# Patient Record
Sex: FEMALE | URBAN - METROPOLITAN AREA
[De-identification: names, ages, dates, MRNs, and addresses within clinical notes are randomized per-mention and may not be internally consistent; named-entity substitution may affect disease eponyms.]

---

## 2019-03-07 ENCOUNTER — HOSPITAL ENCOUNTER (INPATIENT)
Facility: HOSPITAL | Age: 29
LOS: 6 days | Discharge: HOME | DRG: 546 | End: 2019-03-13
Attending: INTERNAL MEDICINE | Admitting: INTERNAL MEDICINE
Payer: MEDICARE

## 2019-03-07 DIAGNOSIS — R80.9 PROTEINURIA, UNSPECIFIED TYPE: ICD-10-CM

## 2019-03-07 DIAGNOSIS — D64.9 ANEMIA, UNSPECIFIED TYPE: ICD-10-CM

## 2019-03-07 DIAGNOSIS — I31.39 PERICARDIAL EFFUSION: ICD-10-CM

## 2019-03-07 DIAGNOSIS — R76.8 ANA POSITIVE: ICD-10-CM

## 2019-03-07 DIAGNOSIS — G40.909 SEIZURE DISORDER: ICD-10-CM

## 2019-03-07 DIAGNOSIS — I31.4 PERICARDIAL TAMPONADE: Primary | ICD-10-CM

## 2019-03-07 LAB
ALBUMIN SERPL BCP-MCNC: 1.8 G/DL
ALP SERPL-CCNC: 94 U/L
ALT SERPL W/O P-5'-P-CCNC: 60 U/L
ANA SER QL IF: POSITIVE
ANA TITR SER IF: NORMAL {TITER}
ANION GAP SERPL CALC-SCNC: 4 MMOL/L
AST SERPL-CCNC: 127 U/L
AV INDEX (PROSTH): 0.81
AV MEAN GRADIENT: 3.39 MMHG
AV PEAK GRADIENT: 6.15 MMHG
AV VALVE AREA: 2.56 CM2
AV VELOCITY RATIO: 0.88
BASOPHILS # BLD AUTO: 0.01 K/UL
BASOPHILS NFR BLD: 0.1 %
BILIRUB SERPL-MCNC: 0.2 MG/DL
BSA FOR ECHO PROCEDURE: 1.6 M2
BUN SERPL-MCNC: 7 MG/DL
C3 SERPL-MCNC: 70 MG/DL
C4 SERPL-MCNC: 15 MG/DL
CALCIUM SERPL-MCNC: 7.7 MG/DL
CHLORIDE SERPL-SCNC: 103 MMOL/L
CK SERPL-CCNC: 909 U/L
CO2 SERPL-SCNC: 29 MMOL/L
CREAT SERPL-MCNC: 0.5 MG/DL
CRP SERPL-MCNC: 43.5 MG/L
CV ECHO LV RWT: 0.36 CM
DAT IGG-SP REAG RBC-IMP: NORMAL
DIFFERENTIAL METHOD: ABNORMAL
DOP CALC AO PEAK VEL: 1.24 M/S
DOP CALC AO VTI: 23 CM
DOP CALC LVOT AREA: 3.17 CM2
DOP CALC LVOT DIAMETER: 2.01 CM
DOP CALC LVOT PEAK VEL: 1.09 M/S
DOP CALC LVOT STROKE VOLUME: 58.89 CM3
DOP CALCLVOT PEAK VEL VTI: 18.57 CM
E/E' RATIO: 13.63
ECHO LV POSTERIOR WALL: 0.75 CM (ref 0.6–1.1)
EOSINOPHIL # BLD AUTO: 0.1 K/UL
EOSINOPHIL NFR BLD: 1.8 %
ERYTHROCYTE [DISTWIDTH] IN BLOOD BY AUTOMATED COUNT: 14.5 %
ERYTHROCYTE [SEDIMENTATION RATE] IN BLOOD BY WESTERGREN METHOD: 56 MM/HR
EST. GFR  (AFRICAN AMERICAN): >60 ML/MIN/1.73 M^2
EST. GFR  (NON AFRICAN AMERICAN): >60 ML/MIN/1.73 M^2
FERRITIN SERPL-MCNC: 201 NG/ML
FRACTIONAL SHORTENING: 39 % (ref 28–44)
GLUCOSE SERPL-MCNC: 89 MG/DL
HCT VFR BLD AUTO: 30.8 %
HGB BLD-MCNC: 9.6 G/DL
IMM GRANULOCYTES # BLD AUTO: 0.07 K/UL
IMM GRANULOCYTES NFR BLD AUTO: 1 %
INR PPP: 1.1
INTERVENTRICULAR SEPTUM: 0.7 CM (ref 0.6–1.1)
IRON SERPL-MCNC: 59 UG/DL
LA MAJOR: 4.74 CM
LA MINOR: 4.18 CM
LA PPP-IMP: NEGATIVE
LA WIDTH: 3.18 CM
LEFT ATRIUM SIZE: 2 CM
LEFT ATRIUM VOLUME INDEX: 14.9 ML/M2
LEFT ATRIUM VOLUME: 24.02 CM3
LEFT INTERNAL DIMENSION IN SYSTOLE: 2.5 CM (ref 2.1–4)
LEFT VENTRICLE DIASTOLIC VOLUME INDEX: 46.6 ML/M2
LEFT VENTRICLE DIASTOLIC VOLUME: 74.98 ML
LEFT VENTRICLE MASS INDEX: 53.6 G/M2
LEFT VENTRICLE SYSTOLIC VOLUME INDEX: 13.8 ML/M2
LEFT VENTRICLE SYSTOLIC VOLUME: 22.25 ML
LEFT VENTRICULAR INTERNAL DIMENSION IN DIASTOLE: 4.12 CM (ref 3.5–6)
LEFT VENTRICULAR MASS: 86.19 G
LV LATERAL E/E' RATIO: 15.57
LV SEPTAL E/E' RATIO: 12.11
LYMPHOCYTES # BLD AUTO: 2.1 K/UL
LYMPHOCYTES NFR BLD: 28.9 %
MAGNESIUM SERPL-MCNC: 1.1 MG/DL
MCH RBC QN AUTO: 30.3 PG
MCHC RBC AUTO-ENTMCNC: 31.2 G/DL
MCV RBC AUTO: 97 FL
MONOCYTES # BLD AUTO: 0.7 K/UL
MONOCYTES NFR BLD: 9.6 %
MV PEAK E VEL: 1.09 M/S
NEUTROPHILS # BLD AUTO: 4.3 K/UL
NEUTROPHILS NFR BLD: 58.6 %
NRBC BLD-RTO: 0 /100 WBC
PHOSPHATE SERPL-MCNC: 2.6 MG/DL
PISA TR MAX VEL: 2.64 M/S
PLATELET # BLD AUTO: 347 K/UL
PMV BLD AUTO: 10.1 FL
POTASSIUM SERPL-SCNC: 3.4 MMOL/L
PROT SERPL-MCNC: 6.7 G/DL
PROTHROMBIN TIME: 11.2 SEC
RA MAJOR: 4.42 CM
RA PRESSURE: 3 MMHG
RA WIDTH: 3.34 CM
RBC # BLD AUTO: 3.17 M/UL
RIGHT VENTRICULAR END-DIASTOLIC DIMENSION: 3.55 CM
SATURATED IRON: 51 %
SINUS: 2.88 CM
SODIUM SERPL-SCNC: 136 MMOL/L
TDI LATERAL: 0.07
TDI SEPTAL: 0.09
TDI: 0.08
TOTAL IRON BINDING CAPACITY: 115 UG/DL
TR MAX PG: 27.88 MMHG
TRANSFERRIN SERPL-MCNC: 78 MG/DL
TV REST PULMONARY ARTERY PRESSURE: 31 MMHG
WBC # BLD AUTO: 7.29 K/UL

## 2019-03-07 PROCEDURE — 87040 BLOOD CULTURE FOR BACTERIA: CPT | Mod: 59

## 2019-03-07 PROCEDURE — 83735 ASSAY OF MAGNESIUM: CPT

## 2019-03-07 PROCEDURE — 80053 COMPREHEN METABOLIC PANEL: CPT

## 2019-03-07 PROCEDURE — 99291 PR CRITICAL CARE, E/M 30-74 MINUTES: ICD-10-PCS | Mod: GC,,, | Performed by: INTERNAL MEDICINE

## 2019-03-07 PROCEDURE — 99223 1ST HOSP IP/OBS HIGH 75: CPT | Mod: ,,, | Performed by: INTERNAL MEDICINE

## 2019-03-07 PROCEDURE — 25000003 PHARM REV CODE 250: Performed by: PHYSICIAN ASSISTANT

## 2019-03-07 PROCEDURE — 82728 ASSAY OF FERRITIN: CPT

## 2019-03-07 PROCEDURE — 82550 ASSAY OF CK (CPK): CPT

## 2019-03-07 PROCEDURE — 95816 PR EEG,W/AWAKE & DROWSY RECORD: ICD-10-PCS | Mod: 26,,, | Performed by: PSYCHIATRY & NEUROLOGY

## 2019-03-07 PROCEDURE — 95816 EEG AWAKE AND DROWSY: CPT | Mod: 26,,, | Performed by: PSYCHIATRY & NEUROLOGY

## 2019-03-07 PROCEDURE — 86235 NUCLEAR ANTIGEN ANTIBODY: CPT | Mod: 59

## 2019-03-07 PROCEDURE — 85025 COMPLETE CBC W/AUTO DIFF WBC: CPT

## 2019-03-07 PROCEDURE — 86146 BETA-2 GLYCOPROTEIN ANTIBODY: CPT | Mod: 59

## 2019-03-07 PROCEDURE — 36415 COLL VENOUS BLD VENIPUNCTURE: CPT

## 2019-03-07 PROCEDURE — 86147 CARDIOLIPIN ANTIBODY EA IG: CPT

## 2019-03-07 PROCEDURE — 85613 RUSSELL VIPER VENOM DILUTED: CPT

## 2019-03-07 PROCEDURE — 99291 CRITICAL CARE FIRST HOUR: CPT | Mod: GC,,, | Performed by: INTERNAL MEDICINE

## 2019-03-07 PROCEDURE — 85610 PROTHROMBIN TIME: CPT

## 2019-03-07 PROCEDURE — 86160 COMPLEMENT ANTIGEN: CPT

## 2019-03-07 PROCEDURE — 83540 ASSAY OF IRON: CPT

## 2019-03-07 PROCEDURE — 63600175 PHARM REV CODE 636 W HCPCS: Performed by: PHYSICIAN ASSISTANT

## 2019-03-07 PROCEDURE — 86140 C-REACTIVE PROTEIN: CPT

## 2019-03-07 PROCEDURE — 86038 ANTINUCLEAR ANTIBODIES: CPT

## 2019-03-07 PROCEDURE — 99223 PR INITIAL HOSPITAL CARE,LEVL III: ICD-10-PCS | Mod: ,,, | Performed by: INTERNAL MEDICINE

## 2019-03-07 PROCEDURE — 95816 EEG AWAKE AND DROWSY: CPT

## 2019-03-07 PROCEDURE — 86039 ANTINUCLEAR ANTIBODIES (ANA): CPT

## 2019-03-07 PROCEDURE — 83516 IMMUNOASSAY NONANTIBODY: CPT

## 2019-03-07 PROCEDURE — 25000003 PHARM REV CODE 250

## 2019-03-07 PROCEDURE — 20000000 HC ICU ROOM

## 2019-03-07 PROCEDURE — 82085 ASSAY OF ALDOLASE: CPT

## 2019-03-07 PROCEDURE — 84100 ASSAY OF PHOSPHORUS: CPT

## 2019-03-07 PROCEDURE — 85652 RBC SED RATE AUTOMATED: CPT

## 2019-03-07 PROCEDURE — 86160 COMPLEMENT ANTIGEN: CPT | Mod: 59

## 2019-03-07 PROCEDURE — 80074 ACUTE HEPATITIS PANEL: CPT

## 2019-03-07 PROCEDURE — 86880 COOMBS TEST DIRECT: CPT

## 2019-03-07 PROCEDURE — 63600175 PHARM REV CODE 636 W HCPCS

## 2019-03-07 RX ORDER — ACETAMINOPHEN 325 MG/1
650 TABLET ORAL EVERY 4 HOURS PRN
Status: DISCONTINUED | OUTPATIENT
Start: 2019-03-07 | End: 2019-03-13 | Stop reason: HOSPADM

## 2019-03-07 RX ORDER — ONDANSETRON 8 MG/1
8 TABLET, ORALLY DISINTEGRATING ORAL EVERY 8 HOURS PRN
Status: DISCONTINUED | OUTPATIENT
Start: 2019-03-07 | End: 2019-03-13 | Stop reason: HOSPADM

## 2019-03-07 RX ORDER — SODIUM CHLORIDE 0.9 % (FLUSH) 0.9 %
3 SYRINGE (ML) INJECTION
Status: DISCONTINUED | OUTPATIENT
Start: 2019-03-07 | End: 2019-03-13 | Stop reason: HOSPADM

## 2019-03-07 RX ORDER — ENOXAPARIN SODIUM 100 MG/ML
40 INJECTION SUBCUTANEOUS EVERY 24 HOURS
Status: DISCONTINUED | OUTPATIENT
Start: 2019-03-07 | End: 2019-03-13 | Stop reason: HOSPADM

## 2019-03-07 RX ORDER — MAGNESIUM SULFATE HEPTAHYDRATE 40 MG/ML
2 INJECTION, SOLUTION INTRAVENOUS ONCE
Status: COMPLETED | OUTPATIENT
Start: 2019-03-07 | End: 2019-03-07

## 2019-03-07 RX ORDER — METHYLPREDNISOLONE SOD SUCC 125 MG
125 VIAL (EA) INJECTION DAILY
Status: DISCONTINUED | OUTPATIENT
Start: 2019-03-07 | End: 2019-03-08

## 2019-03-07 RX ORDER — SODIUM,POTASSIUM PHOSPHATES 280-250MG
2 POWDER IN PACKET (EA) ORAL EVERY 4 HOURS
Status: COMPLETED | OUTPATIENT
Start: 2019-03-07 | End: 2019-03-07

## 2019-03-07 RX ORDER — SODIUM,POTASSIUM PHOSPHATES 280-250MG
2 POWDER IN PACKET (EA) ORAL EVERY 4 HOURS
Status: DISPENSED | OUTPATIENT
Start: 2019-03-07 | End: 2019-03-07

## 2019-03-07 RX ORDER — LEVETIRACETAM 500 MG/1
500 TABLET ORAL 2 TIMES DAILY
Status: DISCONTINUED | OUTPATIENT
Start: 2019-03-07 | End: 2019-03-08

## 2019-03-07 RX ORDER — ONDANSETRON 2 MG/ML
4 INJECTION INTRAMUSCULAR; INTRAVENOUS EVERY 8 HOURS PRN
Status: DISCONTINUED | OUTPATIENT
Start: 2019-03-07 | End: 2019-03-13 | Stop reason: HOSPADM

## 2019-03-07 RX ORDER — MAGNESIUM SULFATE HEPTAHYDRATE 40 MG/ML
2 INJECTION, SOLUTION INTRAVENOUS
Status: DISPENSED | OUTPATIENT
Start: 2019-03-07 | End: 2019-03-07

## 2019-03-07 RX ADMIN — POTASSIUM & SODIUM PHOSPHATES POWDER PACK 280-160-250 MG 2 PACKET: 280-160-250 PACK at 05:03

## 2019-03-07 RX ADMIN — ENOXAPARIN SODIUM 40 MG: 100 INJECTION SUBCUTANEOUS at 05:03

## 2019-03-07 RX ADMIN — LEVETIRACETAM 500 MG: 500 TABLET ORAL at 08:03

## 2019-03-07 RX ADMIN — ACETAMINOPHEN 650 MG: 325 TABLET ORAL at 08:03

## 2019-03-07 RX ADMIN — POTASSIUM & SODIUM PHOSPHATES POWDER PACK 280-160-250 MG 2 PACKET: 280-160-250 PACK at 09:03

## 2019-03-07 RX ADMIN — ESLICARBAZEPINE ACETATE 1200 MG: 400 TABLET ORAL at 09:03

## 2019-03-07 RX ADMIN — MAGNESIUM SULFATE IN WATER 2 G: 40 INJECTION, SOLUTION INTRAVENOUS at 05:03

## 2019-03-07 RX ADMIN — LEVETIRACETAM 500 MG: 500 TABLET ORAL at 09:03

## 2019-03-07 RX ADMIN — ONDANSETRON 4 MG: 2 INJECTION INTRAMUSCULAR; INTRAVENOUS at 03:03

## 2019-03-07 RX ADMIN — METHYLPREDNISOLONE SODIUM SUCCINATE 125 MG: 125 INJECTION, POWDER, FOR SOLUTION INTRAMUSCULAR; INTRAVENOUS at 09:03

## 2019-03-07 RX ADMIN — POTASSIUM & SODIUM PHOSPHATES POWDER PACK 280-160-250 MG 2 PACKET: 280-160-250 PACK at 11:03

## 2019-03-07 RX ADMIN — MAGNESIUM SULFATE IN WATER 2 G: 40 INJECTION, SOLUTION INTRAVENOUS at 11:03

## 2019-03-07 NOTE — SUBJECTIVE & OBJECTIVE
No past medical history on file.    No past surgical history on file.    Review of patient's allergies indicates:  No Known Allergies    Family History     None        Tobacco Use    Smoking status: Not on file   Substance and Sexual Activity    Alcohol use: Not on file    Drug use: Not on file    Sexual activity: Not on file      Review of Systems   Constitutional: Positive for fatigue. Negative for fever.   Respiratory: Negative for cough, shortness of breath and wheezing.    Cardiovascular: Positive for chest pain. Negative for palpitations and leg swelling.   Gastrointestinal: Negative for abdominal distention, abdominal pain, constipation, diarrhea, nausea and vomiting.   Endocrine: Negative for polydipsia and polyphagia.   Genitourinary: Negative for dysuria and frequency.   Musculoskeletal: Negative for arthralgias and myalgias.   Neurological: Negative for weakness, light-headedness and headaches.   Hematological: Negative for adenopathy.   Psychiatric/Behavioral: Negative for behavioral problems and confusion.     Objective:     Vital Signs (Most Recent):  Temp: 97.7 °F (36.5 °C) (03/07/19 0545)  Pulse: 88 (03/07/19 0545)  Resp: (!) 26 (03/07/19 0545)  BP: 102/71 (03/07/19 0545)  SpO2: (!) 93 % (03/07/19 0545) Vital Signs (24h Range):  Temp:  [97.7 °F (36.5 °C)-99 °F (37.2 °C)] 97.7 °F (36.5 °C)  Pulse:  [] 88  Resp:  [18-26] 26  SpO2:  [93 %-100 %] 93 %  BP: (102-107)/(61-71) 102/71      There is no height or weight on file to calculate BMI.    No intake or output data in the 24 hours ending 03/07/19 0733    Physical Exam   Constitutional: She is oriented to person, place, and time. She appears well-developed and well-nourished. No distress.   HENT:   Head: Normocephalic and atraumatic.   Mouth/Throat: No oropharyngeal exudate.   Eyes: Pupils are equal, round, and reactive to light.   Neck: Normal range of motion.   R IJ in place and dressed   Cardiovascular: Normal rate, regular rhythm, normal  heart sounds and intact distal pulses.   No murmur heard.  percardial drain in place with serosanguinous drainage    Pulmonary/Chest: Effort normal and breath sounds normal. No stridor. No respiratory distress.   Abdominal: Soft. Bowel sounds are normal. She exhibits no distension. There is no tenderness.   Musculoskeletal: Normal range of motion. She exhibits no edema.   Noted bilateral rash on lower extremities   Neurological: She is alert and oriented to person, place, and time. No cranial nerve deficit.   Skin: Skin is warm and dry. She is not diaphoretic.   Psychiatric: She has a normal mood and affect. Her behavior is normal.   Vitals reviewed.      Vents:     Lines/Drains/Airways          None        Significant Labs:    Recent Results (from the past 24 hour(s))   CBC auto differential    Collection Time: 03/07/19  6:07 AM   Result Value Ref Range    WBC 7.29 3.90 - 12.70 K/uL    RBC 3.17 (L) 4.00 - 5.40 M/uL    Hemoglobin 9.6 (L) 12.0 - 16.0 g/dL    Hematocrit 30.8 (L) 37.0 - 48.5 %    MCV 97 82 - 98 fL    MCH 30.3 27.0 - 31.0 pg    MCHC 31.2 (L) 32.0 - 36.0 g/dL    RDW 14.5 11.5 - 14.5 %    Platelets 347 150 - 350 K/uL    MPV 10.1 9.2 - 12.9 fL    Immature Granulocytes 1.0 (H) 0.0 - 0.5 %    Gran # (ANC) 4.3 1.8 - 7.7 K/uL    Immature Grans (Abs) 0.07 (H) 0.00 - 0.04 K/uL    Lymph # 2.1 1.0 - 4.8 K/uL    Mono # 0.7 0.3 - 1.0 K/uL    Eos # 0.1 0.0 - 0.5 K/uL    Baso # 0.01 0.00 - 0.20 K/uL    nRBC 0 0 /100 WBC    Gran% 58.6 38.0 - 73.0 %    Lymph% 28.9 18.0 - 48.0 %    Mono% 9.6 4.0 - 15.0 %    Eosinophil% 1.8 0.0 - 8.0 %    Basophil% 0.1 0.0 - 1.9 %    Differential Method Automated    Comprehensive metabolic panel    Collection Time: 03/07/19  6:07 AM   Result Value Ref Range    Sodium 136 136 - 145 mmol/L    Potassium 3.4 (L) 3.5 - 5.1 mmol/L    Chloride 103 95 - 110 mmol/L    CO2 29 23 - 29 mmol/L    Glucose 89 70 - 110 mg/dL    BUN, Bld 7 6 - 20 mg/dL    Creatinine 0.5 0.5 - 1.4 mg/dL    Calcium 7.7 (L)  8.7 - 10.5 mg/dL    Total Protein 6.7 6.0 - 8.4 g/dL    Albumin 1.8 (L) 3.5 - 5.2 g/dL    Total Bilirubin 0.2 0.1 - 1.0 mg/dL    Alkaline Phosphatase 94 55 - 135 U/L     (H) 10 - 40 U/L    ALT 60 (H) 10 - 44 U/L    Anion Gap 4 (L) 8 - 16 mmol/L    eGFR if African American >60.0 >60 mL/min/1.73 m^2    eGFR if non African American >60.0 >60 mL/min/1.73 m^2   Magnesium    Collection Time: 03/07/19  6:07 AM   Result Value Ref Range    Magnesium 1.1 (L) 1.6 - 2.6 mg/dL   Phosphorus    Collection Time: 03/07/19  6:07 AM   Result Value Ref Range    Phosphorus 2.6 (L) 2.7 - 4.5 mg/dL   C4 complement    Collection Time: 03/07/19  6:07 AM   Result Value Ref Range    Complement (C-4) 15 11 - 44 mg/dL   C3 complement    Collection Time: 03/07/19  6:07 AM   Result Value Ref Range    Complement (C-3) 70 50 - 180 mg/dL   Sedimentation rate    Collection Time: 03/07/19  6:07 AM   Result Value Ref Range    Sed Rate 56 (H) 0 - 36 mm/Hr   C-reactive protein    Collection Time: 03/07/19  6:07 AM   Result Value Ref Range    CRP 43.5 (H) 0.0 - 8.2 mg/L   Protime-INR    Collection Time: 03/07/19  6:07 AM   Result Value Ref Range    Prothrombin Time 11.2 9.0 - 12.5 sec    INR 1.1 0.8 - 1.2       Significant Imaging: I have reviewed all pertinent imaging results/findings within the past 24 hours.

## 2019-03-07 NOTE — ASSESSMENT & PLAN NOTE
- noted tamponade s/p pericardial drain  - high consideration for connective tissue disease/autoimmune disease as cause   - overall, will repeat SLE labs for now  - hold off antibiotics given no growth per OSH  - drain placed, will need definitive drain management   - rheumatology consulted, appreciate recommendations and assistance

## 2019-03-07 NOTE — HPI
Pinky Lanier is a 29 y.o. lady with a previous seizure disorder who presents as a transfer from [facility] in [place] for further evaluation for pericardial effusion.  Per chart review, patient was recently admitted to on 2/25/2019 for acute chest pain.  SHe was diagnosed with acute, likely viral pericarditis and was noted to have moderate pericardial effusion without tamponade physiology.  She was started on colchicine 0.6 mg PO daily and ibuprofen.  She was subsequently discharged on 2/27 in good condition, however she had noticed worsening severe chest pain that was associated with light headedness, dizziness, and dyspnea.  She then presented to [facility] where she was noted to have hypotension with a BP of 90/60.  There, a CT chest revealed a large pericardial effusion as well as bilateral pleural effusions, R > L.  She was started on a levophed gtt for BP management.  Subsequent 2D echo revealed a large effusion with a maximum thickness of 3.2 cm without evidence of collapse of the R ventricle.  She underwent subsequent pericardial drain placement with initial drain output of 500 ccs per shift.  This eventually improved to approximately 50 ccs within the shift prior to transfer.  Fluid analysis noted 13k WBCs, low pH, and glucose in the 60s; gram stain was negative.  Other labs at the time revealed hypoalbuminemia and hypocomplementemia, where there was concern that the underlying effusions were secondary to lupus.  She was started on methylpred 125 mg IV daily.  CBCs at the time mostly revealed an anemia, but otherwise no leukopenia/leukocytosis or thrombocytopenia.  CMP was also unremarkable with normal kidney function.  She was noted there to have an elevated urine protein.  Additionally, neurology was consulted, who performed an EEG without any epileptiform activity noted.  She was started on keppra, continued on eslicarbazepine, and discontinued phenytoin.  ELVER and anti-histone reported  returned with elevated ELVER, but none were reported.     She was transferred to [facility] on 3/7/2019.  She notes that she has never had chest pain like this previously.  Denies any personal history of skin rashes, light sensitivity rashes, malar rashes, oral ulcers, raynaud's phenomenon, or arthritis.  Denies any family history of lupus or any other rheumatologic disease.  Denies any history of miscarriages or DVTs.  Currently living with her boyfriend.

## 2019-03-07 NOTE — SUBJECTIVE & OBJECTIVE
No past medical history on file.    No past surgical history on file.      There is no immunization history on file for this patient.    Review of patient's allergies indicates:  No Known Allergies  Current Facility-Administered Medications   Medication Frequency    acetaminophen tablet 650 mg Q4H PRN    enoxaparin injection 40 mg Daily    eslicarbazepine Tab 1,200 mg QHS    levETIRAcetam tablet 500 mg BID    magnesium sulfate 2g in water 50mL IVPB (premix) Once    methylPREDNISolone sodium succinate injection 125 mg Daily    ondansetron disintegrating tablet 8 mg Q8H PRN    ondansetron injection 4 mg Q8H PRN    potassium, sodium phosphates 280-160-250 mg packet 2 packet Q4H    potassium, sodium phosphates 280-160-250 mg packet 2 packet Q4H    sodium chloride 0.9% flush 3 mL PRN     Family History     None        Tobacco Use    Smoking status: Not on file   Substance and Sexual Activity    Alcohol use: Not on file    Drug use: Not on file    Sexual activity: Not on file     Review of Systems   Constitutional: Negative for fever.   HENT: Negative for mouth sores.         Negative hair loss    Eyes: Negative for pain and redness.   Respiratory: Negative for chest tightness and shortness of breath.    Cardiovascular: Negative for chest pain.   Gastrointestinal: Negative for abdominal pain.   Genitourinary: Negative for dysuria.   Musculoskeletal: Negative for arthralgias and joint swelling.   Skin: Negative for rash.   Neurological: Positive for seizures.     Objective:     Vital Signs (Most Recent):  Temp: 97.8 °F (36.6 °C) (03/07/19 0700)  Pulse: 87 (03/07/19 1200)  Resp: 19 (03/07/19 1200)  BP: 103/64 (03/07/19 1200)  SpO2: 99 % (03/07/19 1200)  O2 Device (Oxygen Therapy): nasal cannula (03/07/19 1300) Vital Signs (24h Range):  Temp:  [97.7 °F (36.5 °C)-99 °F (37.2 °C)] 97.8 °F (36.6 °C)  Pulse:  [] 87  Resp:  [15-26] 19  SpO2:  [93 %-100 %] 99 %  BP: (100-107)/(58-75) 103/64     Weight: 55.8 kg  (123 lb) (03/07/19 1200)  Body mass index is 20.47 kg/m².  Body surface area is 1.6 meters squared.      Intake/Output Summary (Last 24 hours) at 3/7/2019 1742  Last data filed at 3/7/2019 1400  Gross per 24 hour   Intake --   Output 401 ml   Net -401 ml       Physical Exam   Constitutional: She is well-developed, well-nourished, and in no distress. No distress.   HENT:   Head: Normocephalic and atraumatic.   Mouth/Throat: Oropharynx is clear and moist. No oropharyngeal exudate.   Eyes: Conjunctivae and EOM are normal. Pupils are equal, round, and reactive to light. No scleral icterus.   Neck: Normal range of motion. Neck supple.   Cardiovascular: Normal rate and regular rhythm.    Mild friction rub    Pulmonary/Chest: She has no wheezes.   Decreased breath sounds in bases   On NC  Pericardial drain in place    Abdominal: Soft. Bowel sounds are normal. She exhibits no distension.   Lymphadenopathy:     She has no cervical adenopathy.   Neurological: She is alert.   Focused on pulse ox on finger, answers yes or no to most questions    Skin: Skin is warm and dry. She is not diaphoretic.     Hyperpigmented rash on LE b/l    Musculoskeletal: Normal range of motion. She exhibits no edema.   No synovitis in any joint   Mild left knee effusion, knee is not warm or erythematous          Significant Labs:  BMP:   Recent Labs   Lab 03/07/19  0607   GLU 89      K 3.4*      CO2 29   BUN 7   CREATININE 0.5   CALCIUM 7.7*   MG 1.1*     CBC:   Recent Labs   Lab 03/07/19  0607   WBC 7.29   HGB 9.6*   HCT 30.8*        CMP:   Recent Labs   Lab 03/07/19  0607   GLU 89   CALCIUM 7.7*   ALBUMIN 1.8*   PROT 6.7      K 3.4*   CO2 29      BUN 7   CREATININE 0.5   ALKPHOS 94   ALT 60*   *   BILITOT 0.2     Urinalysis: No results for input(s): COLORU, CLARITYU, SPECGRAV, PHUR, PROTEINUA, GLUCOSEU, BILIRUBINCON, BLOODU, WBCU, RBCU, BACTERIA, MUCUS, NITRITE, LEUKOCYTESUR, UROBILINOGEN, HYALINECASTS in the  last 24 hours.  Urine protein creatinine: No results for input(s): UTPCR in the last 24 hours.    Significant Imaging:  Imaging results within the past 24 hours have been reviewed.

## 2019-03-07 NOTE — CONSULTS
[facility]  Rheumatology  Consult Note    Patient Name: Pinky Lanier  MRN: [id number]  Admission Date: 3/7/2019  Hospital Length of Stay: 0 days  Code Status: Full Code   Attending Provider: Desiree Henson MD  Primary Care Physician: Primary Doctor No  Principal Problem:Pericardial tamponade    Consults  Subjective:     HPI: Pinky Lanier is a 29 year old female with past medical history of seizure disorder (since the age of 1 mos) who presents as a transfer from [facility] in [place] for further evaluation for pericardial effusion and pleural effusions.     Patient was recently admitted to [facility] on 2/25/2019 for acute chest pain.She was diagnosed with acute, likely viral pericarditis and was noted to have moderate pericardial effusion without tamponade physiology.  She was started on colchicine 0.6 mg PO BID and ibuprofen 600mg tid .  She was subsequently discharged on 2/27 in good condition, however she had noticed worsening severe chest pain that was associated with light headedness, dizziness, and dyspnea.    She then presented to [facility] on 3/3 where she was noted to have hypotension with a BP of 90/60.  There, a CT chest revealed a large pericardial effusion as well as bilateral pleural effusions, R > L.  She was started on a levophed gtt for BP management.  Subsequent 2D echo revealed a large effusion with a maximum thickness of 3.2 cm without evidence of collapse of the R ventricle.  She underwent subsequent pericardial drain placement 3/4/19 with initial drain output of 500 ccs per shift.  This eventually improved to approximately 50 ccs within the shift prior to transfer.  Fluid analysis noted 13k WBCs, low pH, and glucose in the 60s; gram stain was negative.  Other labs at the time revealed hypoalbuminemia and hypocomplementemia, and positve ELVER. where there was concern that the underlying effusions were secondary to lupus.  She was initially started on  prednisone 30mg and then increased to methylpred 125 mg IV daily.   Neurology was also who performed an EEG which showed cortical irratlibility from the left frontal central parietal region as well as mild encephalopathy, no seizure activity.  She was started on keppra, continued on eslicarbazepine, and discontinued on phenytoin.      She was transferred to [facility] on 3/7/2019.  Here she was intimally somnolent but then later awake and alert. She denies any chest pain, shortness of breath, rashes, joint pain or swelling, no raynads, oral or nasal ulcers.   No family history of SLE other autoimmune conditions           No past medical history on file.    No past surgical history on file.      There is no immunization history on file for this patient.    Review of patient's allergies indicates:  No Known Allergies  Current Facility-Administered Medications   Medication Frequency    acetaminophen tablet 650 mg Q4H PRN    enoxaparin injection 40 mg Daily    eslicarbazepine Tab 1,200 mg QHS    levETIRAcetam tablet 500 mg BID    magnesium sulfate 2g in water 50mL IVPB (premix) Once    methylPREDNISolone sodium succinate injection 125 mg Daily    ondansetron disintegrating tablet 8 mg Q8H PRN    ondansetron injection 4 mg Q8H PRN    potassium, sodium phosphates 280-160-250 mg packet 2 packet Q4H    potassium, sodium phosphates 280-160-250 mg packet 2 packet Q4H    sodium chloride 0.9% flush 3 mL PRN     Family History     None        Tobacco Use    Smoking status: Not on file   Substance and Sexual Activity    Alcohol use: Not on file    Drug use: Not on file    Sexual activity: Not on file     Review of Systems   Constitutional: Negative for fever.   HENT: Negative for mouth sores.         Negative hair loss    Eyes: Negative for pain and redness.   Respiratory: Negative for chest tightness and shortness of breath.    Cardiovascular: Negative for chest pain.   Gastrointestinal: Negative for abdominal pain.    Genitourinary: Negative for dysuria.   Musculoskeletal: Negative for arthralgias and joint swelling.   Skin: Negative for rash.   Neurological: Positive for seizures.     Objective:     Vital Signs (Most Recent):  Temp: 97.8 °F (36.6 °C) (03/07/19 0700)  Pulse: 87 (03/07/19 1200)  Resp: 19 (03/07/19 1200)  BP: 103/64 (03/07/19 1200)  SpO2: 99 % (03/07/19 1200)  O2 Device (Oxygen Therapy): nasal cannula (03/07/19 1300) Vital Signs (24h Range):  Temp:  [97.7 °F (36.5 °C)-99 °F (37.2 °C)] 97.8 °F (36.6 °C)  Pulse:  [] 87  Resp:  [15-26] 19  SpO2:  [93 %-100 %] 99 %  BP: (100-107)/(58-75) 103/64     Weight: 55.8 kg (123 lb) (03/07/19 1200)  Body mass index is 20.47 kg/m².  Body surface area is 1.6 meters squared.      Intake/Output Summary (Last 24 hours) at 3/7/2019 1742  Last data filed at 3/7/2019 1400  Gross per 24 hour   Intake --   Output 401 ml   Net -401 ml       Physical Exam   Constitutional: She is well-developed, well-nourished, and in no distress. No distress.   HENT:   Head: Normocephalic and atraumatic.   Mouth/Throat: Oropharynx is clear and moist. No oropharyngeal exudate.   Eyes: Conjunctivae and EOM are normal. Pupils are equal, round, and reactive to light. No scleral icterus.   Neck: Normal range of motion. Neck supple.   Cardiovascular: Normal rate and regular rhythm.    Mild friction rub    Pulmonary/Chest: She has no wheezes.   Decreased breath sounds in bases   On NC  Pericardial drain in place    Abdominal: Soft. Bowel sounds are normal. She exhibits no distension.   Lymphadenopathy:     She has no cervical adenopathy.   Neurological: She is alert.   Focused on pulse ox on finger, answers yes or no to most questions    Skin: Skin is warm and dry. She is not diaphoretic.     Hyperpigmented rash on LE b/l    Musculoskeletal: Normal range of motion. She exhibits no edema.   No synovitis in any joint   Mild left knee effusion, knee is not warm or erythematous          Significant  Labs:  BMP:   Recent Labs   Lab 03/07/19  0607   GLU 89      K 3.4*      CO2 29   BUN 7   CREATININE 0.5   CALCIUM 7.7*   MG 1.1*     CBC:   Recent Labs   Lab 03/07/19  0607   WBC 7.29   HGB 9.6*   HCT 30.8*        CMP:   Recent Labs   Lab 03/07/19  0607   GLU 89   CALCIUM 7.7*   ALBUMIN 1.8*   PROT 6.7      K 3.4*   CO2 29      BUN 7   CREATININE 0.5   ALKPHOS 94   ALT 60*   *   BILITOT 0.2     Urinalysis: No results for input(s): COLORU, CLARITYU, SPECGRAV, PHUR, PROTEINUA, GLUCOSEU, BILIRUBINCON, BLOODU, WBCU, RBCU, BACTERIA, MUCUS, NITRITE, LEUKOCYTESUR, UROBILINOGEN, HYALINECASTS in the last 24 hours.  Urine protein creatinine: No results for input(s): UTPCR in the last 24 hours.    Significant Imaging:  Imaging results within the past 24 hours have been reviewed.    Assessment/Plan:     ELVER positive    Pinky Lanier is a 29 year old female with past medical history of seizure disorder (since the age of 1 mos) who presents as a transfer from [facility] in [place] for further evaluation for pericardial effusion and pleural effusions.     Patient was recently admitted to [facility] on 2/25/2019 for acute chest pain.She was diagnosed with acute, likely viral pericarditis and was noted to have moderate pericardial effusion without tamponade physiology.  She was started on colchicine 0.6 mg PO BID and ibuprofen 600mg tid .  She was subsequently discharged on 2/27 in good condition, however she had noticed worsening severe chest pain that was associated with light headedness, dizziness, and dyspnea.    She then presented to [facility] on 3/3 where she was noted to have hypotension with a BP of 90/60.  There, a CT chest revealed a large pericardial effusion as well as bilateral pleural effusions, R > L.  She was started on a levophed gtt for BP management.  Subsequent 2D echo revealed a large effusion with a maximum thickness of 3.2 cm without evidence of  collapse of the R ventricle.  She underwent subsequent pericardial drain placement 3/4/19 with initial drain output of 500 ccs per shift.  This eventually improved to approximately 50 ccs within the shift prior to transfer.  Fluid analysis noted 13k WBCs, low pH, and glucose in the 60s; gram stain was negative.  Other labs at the time revealed hypoalbuminemia and hypocomplementemia, and positve ELVER. where there was concern that the underlying effusions were secondary to lupus.  She was initially started on prednisone 30mg and then increased to methylpred 125 mg IV daily.   Neurology was also who performed an EEG which showed cortical irratlibility from the left frontal central parietal region as well as mild encephalopathy, no seizure activity.  She was started on keppra, continued on eslicarbazepine, and discontinued on phenytoin.      [place] [facility] [place].  Here she was intimally somnolent but then later awake and alert. She denies any chest pain, shortness of breath, rashes, joint pain or swelling, no raynads, oral or nasal ulcers.   No family history of SLE other autoimmune conditions       Labs showing wct of 7.29 with normal differential, hgb 9.6, plt 347, Cr 0.5, ALT 60, , Alk phos 94, T bili 0.2, albumin 1.8, sed rate 56, CRP 43.5. +ELVER 1:2560 C3 70 and C4 15, RAF negative.   Blood cx pending, CXR showing b/l pleural effusions and cardiomegaly.       Problem list:  Pericarditis with pericardial effusion  Pleural effusions b/l  transaminitis   +ELVER (history of low complements although here normal)     At this time there is suspicion that patient pericarditis with pericardial effusion and pleural effusions are from SLE. She is s/p pericardial drain and has been on solumedrol since 3/4 with now minimal drainage. From a mentation standpoint she was somnolent on exam and there was some concern for encephalopathy but on repeat examination she is at her baseline per her family.      Treatments thus far:  Solumedrol 125mg IV 3/4- current    PLAN:  -at this time please continue solumedrol 125mg IV daily   -please obtain CT head, neurology consult for seizures  -elevated LFTs, please obtain CPK, Aldolase and hepatitis labs  -labs pending: ELVER profile, anti-histone (pt on dilantin), APLS labs, UA and UPC          Pericardial effusion      See above          Thank you for your consult.     Barbara Carpenter MD  Rheumatology  [facility]

## 2019-03-07 NOTE — HPI
Pinky Lanier is a 29 year old female with past medical history of seizure disorder (since the age of 1 mos) who presents as a transfer from [facility] in [place] for further evaluation for pericardial effusion and pleural effusions.     Patient was recently admitted to [facility] on 2/25/2019 for acute chest pain.She was diagnosed with acute, likely viral pericarditis and was noted to have moderate pericardial effusion without tamponade physiology.  She was started on colchicine 0.6 mg PO BID and ibuprofen 600mg tid .  She was subsequently discharged on 2/27 in good condition, however she had noticed worsening severe chest pain that was associated with light headedness, dizziness, and dyspnea.    She then presented to [facility] on 3/3 where she was noted to have hypotension with a BP of 90/60.  There, a CT chest revealed a large pericardial effusion as well as bilateral pleural effusions, R > L.  She was started on a levophed gtt for BP management.  Subsequent 2D echo revealed a large effusion with a maximum thickness of 3.2 cm without evidence of collapse of the R ventricle.  She underwent subsequent pericardial drain placement 3/4/19 with initial drain output of 500 ccs per shift.  This eventually improved to approximately 50 ccs within the shift prior to transfer.  Fluid analysis noted 13k WBCs, low pH, and glucose in the 60s; gram stain was negative.  Other labs at the time revealed hypoalbuminemia and hypocomplementemia, and positve ELVER. where there was concern that the underlying effusions were secondary to lupus.  She was initially started on prednisone 30mg and then increased to methylpred 125 mg IV daily.   Neurology was also who performed an EEG which showed cortical irratlibility from the left frontal central parietal region as well as mild encephalopathy, no seizure activity.  She was started on keppra, continued on eslicarbazepine, and discontinued on phenytoin.      She was  transferred [place] [facility] [place]7/[place].  Here she was intimally somnolent but then later awake and alert. She denies any chest pain, shortness of breath, rashes, joint pain or swelling, no raynads, oral or nasal ulcers.   No family history of SLE other autoimmune conditions

## 2019-03-07 NOTE — ASSESSMENT & PLAN NOTE
Pinky Lanier is a 29 year old female with past medical history of seizure disorder (since the age of 1 mos) who presents as a transfer from [facility] in [place] for further evaluation for pericardial effusion and pleural effusions.     Patient was recently admitted to [facility] on 2/25/2019 for acute chest pain.She was diagnosed with acute, likely viral pericarditis and was noted to have moderate pericardial effusion without tamponade physiology.  She was started on colchicine 0.6 mg PO BID and ibuprofen 600mg tid .  She was subsequently discharged on 2/27 in good condition, however she had noticed worsening severe chest pain that was associated with light headedness, dizziness, and dyspnea.    She then presented to [facility] on 3/3 where she was noted to have hypotension with a BP of 90/60.  There, a CT chest revealed a large pericardial effusion as well as bilateral pleural effusions, R > L.  She was started on a levophed gtt for BP management.  Subsequent 2D echo revealed a large effusion with a maximum thickness of 3.2 cm without evidence of collapse of the R ventricle.  She underwent subsequent pericardial drain placement 3/4/19 with initial drain output of 500 ccs per shift.  This eventually improved to approximately 50 ccs within the shift prior to transfer.  Fluid analysis noted 13k WBCs, low pH, and glucose in the 60s; gram stain was negative.  Other labs at the time revealed hypoalbuminemia and hypocomplementemia, and positve EVLER. where there was concern that the underlying effusions were secondary to lupus.  She was initially started on prednisone 30mg and then increased to methylpred 125 mg IV daily.   Neurology was also who performed an EEG which showed cortical irratlibility from the left frontal central parietal region as well as mild encephalopathy, no seizure activity.  She was started on keppra, continued on eslicarbazepine, and discontinued on phenytoin.      [place] [facility] [place]  Here she was intimally somnolent but then later awake and alert. She denies any chest pain, shortness of breath, rashes, joint pain or swelling, no raynads, oral or nasal ulcers.   No family history of SLE other autoimmune conditions       Labs showing wct of 7.29 with normal differential, hgb 9.6, plt 347, Cr 0.5, ALT 60, , Alk phos 94, T bili 0.2, albumin 1.8, sed rate 56, CRP 43.5. +ELVER 1:2560 C3 70 and C4 15, RAF negative.   Blood cx pending, CXR showing b/l pleural effusions and cardiomegaly.       Problem list:  Pericarditis with pericardial effusion  Pleural effusions b/l  transaminitis   +ELVER (history of low complements although here normal)     At this time there is suspicion that patient pericarditis with pericardial effusion and pleural effusions are from SLE. She is s/p pericardial drain and has been on solumedrol since 3/4 with now minimal drainage. From a mentation standpoint she was somnolent on exam and there was some concern for encephalopathy but on repeat examination she is at her baseline per her family.     Treatments thus far:  Solumedrol 125mg IV 3/4- current    PLAN:  -at this time please continue solumedrol 125mg IV daily   -please obtain CT head, neurology consult for seizures  -elevated LFTs, please obtain CPK, Aldolase and hepatitis labs  -labs pending: ELVER profile, anti-histone (pt on dilantin), APLS labs, UA and UPC

## 2019-03-07 NOTE — H&P
[facility]  Critical Care Medicine  History & Physical    Patient Name: Pinky Lanier  MRN: [id number]  Admission Date: 3/7/2019  Hospital Length of Stay: 0 days  Code Status: Full Code  Attending Physician: Desiree Henson MD   Primary Care Provider: No primary care provider on file.   Principal Problem: Pericardial tamponade    Subjective:     HPI:  Pinky Lanier is a 29 y.o. lady with a previous seizure disorder who presents as a transfer from [facility] in [place] for further evaluation for pericardial effusion.  Per chart review, patient was recently admitted to on 2/25/2019 for acute chest pain.  SHe was diagnosed with acute, likely viral pericarditis and was noted to have moderate pericardial effusion without tamponade physiology.  She was started on colchicine 0.6 mg PO daily and ibuprofen.  She was subsequently discharged on 2/27 in good condition, however she had noticed worsening severe chest pain that was associated with light headedness, dizziness, and dyspnea.  She then presented to [facility] where she was noted to have hypotension with a BP of 90/60.  There, a CT chest revealed a large pericardial effusion as well as bilateral pleural effusions, R > L.  She was started on a levophed gtt for BP management.  Subsequent 2D echo revealed a large effusion with a maximum thickness of 3.2 cm without evidence of collapse of the R ventricle.  She underwent subsequent pericardial drain placement with initial drain output of 500 ccs per shift.  This eventually improved to approximately 50 ccs within the shift prior to transfer.  Fluid analysis noted 13k WBCs, low pH, and glucose in the 60s; gram stain was negative.  Other labs at the time revealed hypoalbuminemia and hypocomplementemia, where there was concern that the underlying effusions were secondary to lupus.  She was started on methylpred 125 mg IV daily.  CBCs at the time mostly revealed an anemia, but otherwise no  leukopenia/leukocytosis or thrombocytopenia.  CMP was also unremarkable with normal kidney function.  She was noted there to have an elevated urine protein.  Additionally, neurology was consulted, who performed an EEG without any epileptiform activity noted.  She was started on keppra, continued on eslicarbazepine, and discontinued phenytoin.  ELVER and anti-histone reported returned with elevated ELVER, but none were reported.     She was transferred to [facility] on 3/7/2019.  She notes that she has never had chest pain like this previously.  Denies any personal history of skin rashes, light sensitivity rashes, malar rashes, oral ulcers, raynaud's phenomenon, or arthritis.  Denies any family history of lupus or any other rheumatologic disease.  Denies any history of miscarriages or DVTs.  Currently living with her boyfriend.      Hospital/ICU Course:  No notes on file     No past medical history on file.    No past surgical history on file.    Review of patient's allergies indicates:  No Known Allergies    Family History     None        Tobacco Use    Smoking status: Not on file   Substance and Sexual Activity    Alcohol use: Not on file    Drug use: Not on file    Sexual activity: Not on file      Review of Systems   Constitutional: Positive for fatigue. Negative for fever.   Respiratory: Negative for cough, shortness of breath and wheezing.    Cardiovascular: Positive for chest pain. Negative for palpitations and leg swelling.   Gastrointestinal: Negative for abdominal distention, abdominal pain, constipation, diarrhea, nausea and vomiting.   Endocrine: Negative for polydipsia and polyphagia.   Genitourinary: Negative for dysuria and frequency.   Musculoskeletal: Negative for arthralgias and myalgias.   Neurological: Negative for weakness, light-headedness and headaches.   Hematological: Negative for adenopathy.   Psychiatric/Behavioral: Negative for behavioral problems and confusion.     Objective:     Vital Signs  (Most Recent):  Temp: 97.7 °F (36.5 °C) (03/07/19 0545)  Pulse: 88 (03/07/19 0545)  Resp: (!) 26 (03/07/19 0545)  BP: 102/71 (03/07/19 0545)  SpO2: (!) 93 % (03/07/19 0545) Vital Signs (24h Range):  Temp:  [97.7 °F (36.5 °C)-99 °F (37.2 °C)] 97.7 °F (36.5 °C)  Pulse:  [] 88  Resp:  [18-26] 26  SpO2:  [93 %-100 %] 93 %  BP: (102-107)/(61-71) 102/71      There is no height or weight on file to calculate BMI.    No intake or output data in the 24 hours ending 03/07/19 0733    Physical Exam   Constitutional: She is oriented to person, place, and time. She appears well-developed and well-nourished. No distress.   HENT:   Head: Normocephalic and atraumatic.   Mouth/Throat: No oropharyngeal exudate.   Eyes: Pupils are equal, round, and reactive to light.   Neck: Normal range of motion.   R IJ in place and dressed   Cardiovascular: Normal rate, regular rhythm, normal heart sounds and intact distal pulses.   No murmur heard.  percardial drain in place with serosanguinous drainage    Pulmonary/Chest: Effort normal and breath sounds normal. No stridor. No respiratory distress.   Abdominal: Soft. Bowel sounds are normal. She exhibits no distension. There is no tenderness.   Musculoskeletal: Normal range of motion. She exhibits no edema.   Noted bilateral rash on lower extremities   Neurological: She is alert and oriented to person, place, and time. No cranial nerve deficit.   Skin: Skin is warm and dry. She is not diaphoretic.   Psychiatric: She has a normal mood and affect. Her behavior is normal.   Vitals reviewed.      Vents:     Lines/Drains/Airways          None        Significant Labs:    Recent Results (from the past 24 hour(s))   CBC auto differential    Collection Time: 03/07/19  6:07 AM   Result Value Ref Range    WBC 7.29 3.90 - 12.70 K/uL    RBC 3.17 (L) 4.00 - 5.40 M/uL    Hemoglobin 9.6 (L) 12.0 - 16.0 g/dL    Hematocrit 30.8 (L) 37.0 - 48.5 %    MCV 97 82 - 98 fL    MCH 30.3 27.0 - 31.0 pg    MCHC 31.2 (L)  32.0 - 36.0 g/dL    RDW 14.5 11.5 - 14.5 %    Platelets 347 150 - 350 K/uL    MPV 10.1 9.2 - 12.9 fL    Immature Granulocytes 1.0 (H) 0.0 - 0.5 %    Gran # (ANC) 4.3 1.8 - 7.7 K/uL    Immature Grans (Abs) 0.07 (H) 0.00 - 0.04 K/uL    Lymph # 2.1 1.0 - 4.8 K/uL    Mono # 0.7 0.3 - 1.0 K/uL    Eos # 0.1 0.0 - 0.5 K/uL    Baso # 0.01 0.00 - 0.20 K/uL    nRBC 0 0 /100 WBC    Gran% 58.6 38.0 - 73.0 %    Lymph% 28.9 18.0 - 48.0 %    Mono% 9.6 4.0 - 15.0 %    Eosinophil% 1.8 0.0 - 8.0 %    Basophil% 0.1 0.0 - 1.9 %    Differential Method Automated    Comprehensive metabolic panel    Collection Time: 03/07/19  6:07 AM   Result Value Ref Range    Sodium 136 136 - 145 mmol/L    Potassium 3.4 (L) 3.5 - 5.1 mmol/L    Chloride 103 95 - 110 mmol/L    CO2 29 23 - 29 mmol/L    Glucose 89 70 - 110 mg/dL    BUN, Bld 7 6 - 20 mg/dL    Creatinine 0.5 0.5 - 1.4 mg/dL    Calcium 7.7 (L) 8.7 - 10.5 mg/dL    Total Protein 6.7 6.0 - 8.4 g/dL    Albumin 1.8 (L) 3.5 - 5.2 g/dL    Total Bilirubin 0.2 0.1 - 1.0 mg/dL    Alkaline Phosphatase 94 55 - 135 U/L     (H) 10 - 40 U/L    ALT 60 (H) 10 - 44 U/L    Anion Gap 4 (L) 8 - 16 mmol/L    eGFR if African American >60.0 >60 mL/min/1.73 m^2    eGFR if non African American >60.0 >60 mL/min/1.73 m^2   Magnesium    Collection Time: 03/07/19  6:07 AM   Result Value Ref Range    Magnesium 1.1 (L) 1.6 - 2.6 mg/dL   Phosphorus    Collection Time: 03/07/19  6:07 AM   Result Value Ref Range    Phosphorus 2.6 (L) 2.7 - 4.5 mg/dL   C4 complement    Collection Time: 03/07/19  6:07 AM   Result Value Ref Range    Complement (C-4) 15 11 - 44 mg/dL   C3 complement    Collection Time: 03/07/19  6:07 AM   Result Value Ref Range    Complement (C-3) 70 50 - 180 mg/dL   Sedimentation rate    Collection Time: 03/07/19  6:07 AM   Result Value Ref Range    Sed Rate 56 (H) 0 - 36 mm/Hr   C-reactive protein    Collection Time: 03/07/19  6:07 AM   Result Value Ref Range    CRP 43.5 (H) 0.0 - 8.2 mg/L   Protime-INR     Collection Time: 03/07/19  6:07 AM   Result Value Ref Range    Prothrombin Time 11.2 9.0 - 12.5 sec    INR 1.1 0.8 - 1.2       Significant Imaging: I have reviewed all pertinent imaging results/findings within the past 24 hours.       Assessment/Plan:     Neuro   Seizure disorder    - known previous history, states taking medication since her childhood  - previously on aptiom and dilantin  - dilantin discontinued given risk for SLE, started on keppra  - can continue keppra for now     Cardiac/Vascular   * Pericardial tamponade    - noted tamponade s/p pericardial drain  - high consideration for connective tissue disease/autoimmune disease as cause   - overall, will repeat SLE labs for now  - hold off antibiotics given no growth per OSH  - drain placed, will need definitive drain management   - rheumatology consulted, appreciate recommendations and assistance      Oncology   Anemia    - noted anemia with MCV of 97  - hemodynamically stable  - iron, TIBC, and ferritin ordered  - no evidence of acute blood loss  - transfuse < 7 as needed   - daily CBCs         Critical Care Daily Checklist:    A: Awake: RASS Goal/Actual Goal:    Actual:     B: Spontaneous Breathing Trial Performed?     C: SAT & SBT Coordinated?  None required                      D: Delirium: CAM-ICU     E: Early Mobility Performed? Yes   F: Feeding Goal:    Status:     Current Diet Order   Procedures    Diet Adult Regular (IDDSI Level 7)      AS: Analgesia/Sedation none   T: Thromboembolic Prophylaxis SCDs   H: HOB > 300 Yes   U: Stress Ulcer Prophylaxis (if needed) none   G: Glucose Control none   B: Bowel Function     I: Indwelling Catheter (Lines & [place]) Necessity R IJ, PIV x 2   D: De-escalation of Antimicrobials/Pharmacotherapies none    Plan for the day/ETD Rheumatology consult, follow up labs, pericardial drain management    Code Status:  Family/Goals of Care: Full Code         Critical secondary to Patient has a condition that poses threat  to life and bodily function: pericardial tamponade     Critical care was time spent personally by me on the following activities: development of treatment plan with patient or surrogate and bedside caregivers, discussions with consultants, evaluation of patient's response to treatment, examination of patient, ordering and performing treatments and interventions, ordering and review of laboratory studies, ordering and review of radiographic studies, pulse oximetry, re-evaluation of patient's condition. This critical care time did not overlap with that of any other provider or involve time for any procedures.     Rob Ramirez MD  Critical Care Medicine  [facility]

## 2019-03-07 NOTE — PLAN OF CARE
No primary care provider on file.  - Patient's mother confirms patient does not have established PCP.    [id number] - [place], [place] - [address] AT [address]  [address]  [place] [address]  Phone: [phone] Fax: [phone]    Payor: MEDICARE / Plan: MEDICARE PART A & B / Product Type: Government /     No future appointments.    Extended Emergency Contact Information  Primary Emergency Contact: LATESHA LANDAVERDE  Mobile Phone: [phone]  Relation: Daughter  Preferred language: English   needed? No     03/07/19 1228   Discharge Assessment   Assessment Type Discharge Planning Assessment   Confirmed/corrected address and phone number on facesheet? Yes   Assessment information obtained from? Caregiver  (patients mother)   Expected Length of Stay (days) 3   Communicated expected length of stay with patient/caregiver no   Prior to hospitilization cognitive status: Alert/Oriented   Prior to hospitalization functional status: Independent   Current cognitive status: Unable to Assess  (patient asleep)   Current Functional Status: Assistive Equipment;Needs Assistance   Facility Arrived From: [facility], [place]    Lives With significant other   Able to Return to Prior Arrangements yes   Is patient able to care for self after discharge? Yes   Who are your caregiver(s) and their phone number(s)? Patient independent PTA; NOK is mother - Teodoro Rogel [phone]   Patient's perception of discharge disposition home or selfcare   Readmission Within the Last 30 Days previous discharge plan unsuccessful   Patient currently being followed by outpatient case management? No   Patient currently receives any other outside agency services? No   Equipment Currently Used at Home none   Do you have any problems affording any of your prescribed medications? No   Is the patient taking medications as prescribed? yes   Does the patient have transportation home? Yes   Transportation  Anticipated family or friend will provide;car, drives self   Does the patient receive services at the [facility]? No   Discharge Plan A Home with family   Discharge Plan B Rehab   DME Needed Upon Discharge  other (see comments)  (TBD)   Patient/Family in Agreement with Plan yes   CM provided blue [employer] [facility] packet at bedside and updated white board with contact information.  CM will continue to follow for discharge needs.  Ana Laura James RN, BSN, CCM  Case Management  [facility]  Ext. [phone]

## 2019-03-07 NOTE — ASSESSMENT & PLAN NOTE
- noted anemia with MCV of 97  - hemodynamically stable  - iron, TIBC, and ferritin ordered  - no evidence of acute blood loss  - transfuse < 7 as needed   - daily CBCs

## 2019-03-07 NOTE — ASSESSMENT & PLAN NOTE
- known previous history, states taking medication since her childhood  - previously on aptiom and dilantin  - dilantin discontinued given risk for SLE, started on keppra  - can continue keppra for now

## 2019-03-08 LAB
ALBUMIN SERPL BCP-MCNC: 1.7 G/DL
ALP SERPL-CCNC: 92 U/L
ALT SERPL W/O P-5'-P-CCNC: 59 U/L
ANION GAP SERPL CALC-SCNC: 4 MMOL/L
ANTI SM ANTIBODY: 61.68 EU
ANTI-SM INTERPRETATION: POSITIVE
ANTI-SSA ANTIBODY: 3.41 EU
ANTI-SSA INTERPRETATION: NEGATIVE
ANTI-SSB ANTIBODY: 7.46 EU
ANTI-SSB INTERPRETATION: NEGATIVE
APPEARANCE FLD: NORMAL
AST SERPL-CCNC: 115 U/L
B-HCG UR QL: NEGATIVE
BACTERIA #/AREA URNS AUTO: NORMAL /HPF
BASOPHILS # BLD AUTO: 0.01 K/UL
BASOPHILS NFR BLD: 0.2 %
BILIRUB SERPL-MCNC: 0.1 MG/DL
BILIRUB UR QL STRIP: NEGATIVE
BODY FLD TYPE: NORMAL
BUN SERPL-MCNC: 7 MG/DL
CALCIUM SERPL-MCNC: 8 MG/DL
CARDIOLIPIN IGG SER IA-ACNC: <9.4 GPL
CARDIOLIPIN IGM SER IA-ACNC: 20.93 MPL
CHLORIDE SERPL-SCNC: 103 MMOL/L
CK SERPL-CCNC: 758 U/L
CLARITY UR REFRACT.AUTO: CLEAR
CO2 SERPL-SCNC: 28 MMOL/L
COLOR FLD: COLORLESS
COLOR UR AUTO: YELLOW
CREAT SERPL-MCNC: 0.5 MG/DL
CREAT UR-MCNC: 68 MG/DL
CREAT UR-MCNC: 68 MG/DL
DIFFERENTIAL METHOD: ABNORMAL
EOSINOPHIL # BLD AUTO: 0 K/UL
EOSINOPHIL NFR BLD: 0.2 %
ERYTHROCYTE [DISTWIDTH] IN BLOOD BY AUTOMATED COUNT: 14.6 %
EST. GFR  (AFRICAN AMERICAN): >60 ML/MIN/1.73 M^2
EST. GFR  (NON AFRICAN AMERICAN): >60 ML/MIN/1.73 M^2
GLUCOSE SERPL-MCNC: 135 MG/DL
GLUCOSE UR QL STRIP: NEGATIVE
HAV IGM SERPL QL IA: NEGATIVE
HBV CORE IGM SERPL QL IA: NEGATIVE
HBV SURFACE AG SERPL QL IA: NEGATIVE
HCT VFR BLD AUTO: 32.6 %
HCV AB SERPL QL IA: NEGATIVE
HGB BLD-MCNC: 10.2 G/DL
HGB UR QL STRIP: NEGATIVE
IMM GRANULOCYTES # BLD AUTO: 0.05 K/UL
IMM GRANULOCYTES NFR BLD AUTO: 0.9 %
KETONES UR QL STRIP: NEGATIVE
LEUKOCYTE ESTERASE UR QL STRIP: ABNORMAL
LYMPHOCYTES # BLD AUTO: 0.9 K/UL
LYMPHOCYTES NFR BLD: 16.8 %
LYMPHOCYTES NFR FLD MANUAL: 83 %
MAGNESIUM SERPL-MCNC: 1.7 MG/DL
MCH RBC QN AUTO: 30.2 PG
MCHC RBC AUTO-ENTMCNC: 31.3 G/DL
MCV RBC AUTO: 96 FL
MICROSCOPIC COMMENT: NORMAL
MONOCYTES # BLD AUTO: 0.1 K/UL
MONOCYTES NFR BLD: 2.1 %
MONOS+MACROS NFR FLD MANUAL: 8 %
NEUTROPHILS # BLD AUTO: 4.2 K/UL
NEUTROPHILS NFR BLD: 79.8 %
NEUTROPHILS NFR FLD MANUAL: 9 %
NITRITE UR QL STRIP: NEGATIVE
NRBC BLD-RTO: 0 /100 WBC
PH UR STRIP: 8 [PH] (ref 5–8)
PHOSPHATE SERPL-MCNC: 4.1 MG/DL
PLATELET # BLD AUTO: 369 K/UL
PMV BLD AUTO: 9.9 FL
POTASSIUM SERPL-SCNC: 4.5 MMOL/L
PROT SERPL-MCNC: 6.8 G/DL
PROT UR QL STRIP: NEGATIVE
PROT UR-MCNC: 25 MG/DL
PROT UR-MCNC: 25 MG/DL
PROT/CREAT UR: 0.37 MG/G{CREAT}
PROT/CREAT UR: 0.37 MG/G{CREAT}
RBC # BLD AUTO: 3.38 M/UL
RBC #/AREA URNS AUTO: 0 /HPF (ref 0–4)
SODIUM SERPL-SCNC: 135 MMOL/L
SP GR UR STRIP: 1.02 (ref 1–1.03)
SQUAMOUS #/AREA URNS AUTO: 1 /HPF
T4 FREE SERPL-MCNC: 0.49 NG/DL
TSH SERPL DL<=0.005 MIU/L-ACNC: 1.93 UIU/ML
URN SPEC COLLECT METH UR: ABNORMAL
WBC # BLD AUTO: 5.31 K/UL
WBC # FLD: 2100 /CU MM
WBC #/AREA URNS AUTO: 3 /HPF (ref 0–5)

## 2019-03-08 PROCEDURE — 84443 ASSAY THYROID STIM HORMONE: CPT

## 2019-03-08 PROCEDURE — 25000003 PHARM REV CODE 250

## 2019-03-08 PROCEDURE — 87070 CULTURE OTHR SPECIMN AEROBIC: CPT

## 2019-03-08 PROCEDURE — 89060 EXAM SYNOVIAL FLUID CRYSTALS: CPT

## 2019-03-08 PROCEDURE — 89051 BODY FLUID CELL COUNT: CPT

## 2019-03-08 PROCEDURE — 85025 COMPLETE CBC W/AUTO DIFF WBC: CPT

## 2019-03-08 PROCEDURE — 25000003 PHARM REV CODE 250: Performed by: PSYCHIATRY & NEUROLOGY

## 2019-03-08 PROCEDURE — 20610 DRAIN/INJ JOINT/BURSA W/O US: CPT | Mod: LT,,, | Performed by: INTERNAL MEDICINE

## 2019-03-08 PROCEDURE — 84439 ASSAY OF FREE THYROXINE: CPT

## 2019-03-08 PROCEDURE — 82550 ASSAY OF CK (CPK): CPT

## 2019-03-08 PROCEDURE — 83735 ASSAY OF MAGNESIUM: CPT

## 2019-03-08 PROCEDURE — 80053 COMPREHEN METABOLIC PANEL: CPT

## 2019-03-08 PROCEDURE — 84100 ASSAY OF PHOSPHORUS: CPT

## 2019-03-08 PROCEDURE — 84156 ASSAY OF PROTEIN URINE: CPT

## 2019-03-08 PROCEDURE — 20600001 HC STEP DOWN PRIVATE ROOM

## 2019-03-08 PROCEDURE — 63600175 PHARM REV CODE 636 W HCPCS: Performed by: PHYSICIAN ASSISTANT

## 2019-03-08 PROCEDURE — 99233 SBSQ HOSP IP/OBS HIGH 50: CPT | Mod: 25,GC,, | Performed by: INTERNAL MEDICINE

## 2019-03-08 PROCEDURE — 20610 PR DRAIN/INJECT LARGE JOINT/BURSA: ICD-10-PCS | Mod: LT,,, | Performed by: INTERNAL MEDICINE

## 2019-03-08 PROCEDURE — 99223 1ST HOSP IP/OBS HIGH 75: CPT | Mod: ,,, | Performed by: PSYCHIATRY & NEUROLOGY

## 2019-03-08 PROCEDURE — 36415 COLL VENOUS BLD VENIPUNCTURE: CPT

## 2019-03-08 PROCEDURE — 81025 URINE PREGNANCY TEST: CPT

## 2019-03-08 PROCEDURE — 99223 PR INITIAL HOSPITAL CARE,LEVL III: ICD-10-PCS | Mod: ,,, | Performed by: PSYCHIATRY & NEUROLOGY

## 2019-03-08 PROCEDURE — 99291 PR CRITICAL CARE, E/M 30-74 MINUTES: ICD-10-PCS | Mod: GC,,, | Performed by: INTERNAL MEDICINE

## 2019-03-08 PROCEDURE — A9585 GADOBUTROL INJECTION: HCPCS | Performed by: HOSPITALIST

## 2019-03-08 PROCEDURE — 25500020 PHARM REV CODE 255: Performed by: HOSPITALIST

## 2019-03-08 PROCEDURE — 99291 CRITICAL CARE FIRST HOUR: CPT | Mod: GC,,, | Performed by: INTERNAL MEDICINE

## 2019-03-08 PROCEDURE — 94761 N-INVAS EAR/PLS OXIMETRY MLT: CPT

## 2019-03-08 PROCEDURE — 81001 URINALYSIS AUTO W/SCOPE: CPT

## 2019-03-08 PROCEDURE — 63600175 PHARM REV CODE 636 W HCPCS

## 2019-03-08 PROCEDURE — 63600175 PHARM REV CODE 636 W HCPCS: Performed by: STUDENT IN AN ORGANIZED HEALTH CARE EDUCATION/TRAINING PROGRAM

## 2019-03-08 PROCEDURE — 99233 PR SUBSEQUENT HOSPITAL CARE,LEVL III: ICD-10-PCS | Mod: 25,GC,, | Performed by: INTERNAL MEDICINE

## 2019-03-08 RX ORDER — MORPHINE SULFATE 2 MG/ML
2 INJECTION, SOLUTION INTRAMUSCULAR; INTRAVENOUS
Status: DISCONTINUED | OUTPATIENT
Start: 2019-03-08 | End: 2019-03-13 | Stop reason: HOSPADM

## 2019-03-08 RX ORDER — PREDNISONE 20 MG/1
60 TABLET ORAL DAILY
Status: DISCONTINUED | OUTPATIENT
Start: 2019-03-09 | End: 2019-03-11

## 2019-03-08 RX ORDER — GADOBUTROL 604.72 MG/ML
6 INJECTION INTRAVENOUS
Status: COMPLETED | OUTPATIENT
Start: 2019-03-08 | End: 2019-03-08

## 2019-03-08 RX ADMIN — SODIUM CHLORIDE, SODIUM LACTATE, POTASSIUM CHLORIDE, AND CALCIUM CHLORIDE 1000 ML: .6; .31; .03; .02 INJECTION, SOLUTION INTRAVENOUS at 06:03

## 2019-03-08 RX ADMIN — LEVETIRACETAM 500 MG: 500 TABLET ORAL at 08:03

## 2019-03-08 RX ADMIN — MORPHINE SULFATE 2 MG: 2 INJECTION, SOLUTION INTRAMUSCULAR; INTRAVENOUS at 01:03

## 2019-03-08 RX ADMIN — METHYLPREDNISOLONE SODIUM SUCCINATE 125 MG: 125 INJECTION, POWDER, FOR SOLUTION INTRAMUSCULAR; INTRAVENOUS at 08:03

## 2019-03-08 RX ADMIN — ENOXAPARIN SODIUM 40 MG: 100 INJECTION SUBCUTANEOUS at 05:03

## 2019-03-08 RX ADMIN — GADOBUTROL 6 ML: 604.72 INJECTION INTRAVENOUS at 07:03

## 2019-03-08 RX ADMIN — ESLICARBAZEPINE ACETATE 1600 MG: 400 TABLET ORAL at 10:03

## 2019-03-08 NOTE — PROGRESS NOTES
[facility]  Rheumatology  Progress Note    Patient Name: Pinky Lanier  MRN: [id number]  Admission Date: 3/7/2019  Hospital Length of Stay: 1 days  Code Status: Full Code   Attending Provider: Daniele Jaramillo, *  Primary Care Physician: Primary Doctor No  Principal Problem: Pericardial tamponade    Subjective:     HPI: Pinky Lanier is a 29 year old female with past medical history of seizure disorder (since the age of 1 mos) who presents as a transfer from [facility] in [place] for further evaluation for pericardial effusion and pleural effusions.     Patient was recently admitted to [facility] on 2/25/2019 for acute chest pain.She was diagnosed with acute, likely viral pericarditis and was noted to have moderate pericardial effusion without tamponade physiology.  She was started on colchicine 0.6 mg PO BID and ibuprofen 600mg tid .  She was subsequently discharged on 2/27 in good condition, however she had noticed worsening severe chest pain that was associated with light headedness, dizziness, and dyspnea.    She then presented to [facility] on 3/3 where she was noted to have hypotension with a BP of 90/60.  There, a CT chest revealed a large pericardial effusion as well as bilateral pleural effusions, R > L.  She was started on a levophed gtt for BP management.  Subsequent 2D echo revealed a large effusion with a maximum thickness of 3.2 cm without evidence of collapse of the R ventricle.  She underwent subsequent pericardial drain placement 3/4/19 with initial drain output of 500 ccs per shift.  This eventually improved to approximately 50 ccs within the shift prior to transfer.  Fluid analysis noted 13k WBCs, low pH, and glucose in the 60s; gram stain was negative.  Other labs at the time revealed hypoalbuminemia and hypocomplementemia, and positve ELVER. where there was concern that the underlying effusions were secondary to lupus.  She was initially started on  prednisone 30mg and then increased to methylpred 125 mg IV daily.   Neurology was also who performed an EEG which showed cortical irratlibility from the left frontal central parietal region as well as mild encephalopathy, no seizure activity.  She was started on keppra, continued on eslicarbazepine, and discontinued on phenytoin.      She was transferred to [facility] on 3/7/2019.  Here she was intimally somnolent but then later awake and alert. She denies any chest pain, shortness of breath, rashes, joint pain or swelling, no raynads, oral or nasal ulcers.   No family history of SLE other autoimmune conditions           Interval History: no acute overnight events. No seizures. Minimal draingage from her pericardial drain     Current Facility-Administered Medications   Medication Frequency    acetaminophen tablet 650 mg Q4H PRN    enoxaparin injection 40 mg Daily    eslicarbazepine Tab 1,600 mg QHS    methylPREDNISolone sodium succinate injection 125 mg Daily    morphine injection 2 mg On Call Procedure    ondansetron disintegrating tablet 8 mg Q8H PRN    ondansetron injection 4 mg Q8H PRN    sodium chloride 0.9% flush 3 mL PRN     Objective:     Vital Signs (Most Recent):  Temp: 97.4 °F (36.3 °C) (03/08/19 0301)  Pulse: 96 (03/08/19 1300)  Resp: (!) 28 (03/08/19 1300)  BP: (!) 110/56 (03/08/19 1300)  SpO2: (!) 94 % (03/08/19 1300)  O2 Device (Oxygen Therapy): room air (03/08/19 1300) Vital Signs (24h Range):  Temp:  [97.2 °F (36.2 °C)-97.7 °F (36.5 °C)] 97.4 °F (36.3 °C)  Pulse:  [] 96  Resp:  [13-36] 28  SpO2:  [89 %-100 %] 94 %  BP: ()/(52-75) 110/56     Weight: 55.8 kg (123 lb) (03/07/19 1200)  Body mass index is 20.47 kg/m².  Body surface area is 1.6 meters squared.      Intake/Output Summary (Last 24 hours) at 3/8/2019 1320  Last data filed at 3/8/2019 0900  Gross per 24 hour   Intake 1580 ml   Output 501 ml   Net 1079 ml       Physical Exam   Constitutional: She is oriented to person, place, and  time and well-developed, well-nourished, and in no distress. No distress.   HENT:   Head: Normocephalic and atraumatic.   Mouth/Throat: Oropharynx is clear and moist. No oropharyngeal exudate.   Eyes: Conjunctivae and EOM are normal. Pupils are equal, round, and reactive to light. No scleral icterus.   Neck: Normal range of motion. Neck supple.   Cardiovascular: Normal rate and regular rhythm.    Mild friction rub    Pulmonary/Chest: She has no wheezes.   Decreased breath sounds in bases   Pericardial drain in place    Abdominal: Soft. Bowel sounds are normal. She exhibits no distension.   Lymphadenopathy:     She has no cervical adenopathy.   Neurological: She is alert and oriented to person, place, and time.   Skin: Skin is warm and dry. She is not diaphoretic.     Hyperpigmented rash on LE b/l    Musculoskeletal: Normal range of motion. She exhibits no edema.   No synovitis in any joint   Mild left knee effusion, knee is not warm or erythematous          Significant Labs:  BMP:   Recent Labs   Lab 03/08/19  0409   *   *   K 4.5      CO2 28   BUN 7   CREATININE 0.5   CALCIUM 8.0*   MG 1.7     CBC:   Recent Labs   Lab 03/08/19  0409   WBC 5.31   HGB 10.2*   HCT 32.6*   *       Significant Imaging:  Imaging results within the past 24 hours have been reviewed.    Assessment/Plan:     ELVER positive    Pinky Lanier is a 29 year old female with past medical history of seizure disorder (since the age of 1 mos) who presents as a transfer from [facility] in [place] for further evaluation for pericardial effusion and pleural effusions.     Patient was recently admitted to [facility] on 2/25/2019 for acute chest pain.She was diagnosed with acute, likely viral pericarditis and was noted to have moderate pericardial effusion without tamponade physiology.  She was started on colchicine 0.6 mg PO BID and ibuprofen 600mg tid .  She was subsequently discharged on 2/27 in good condition,  however she had noticed worsening severe chest pain that was associated with light headedness, dizziness, and dyspnea.    She then presented to [facility] on 3/3 where she was noted to have hypotension with a BP of 90/60.  There, a CT chest revealed a large pericardial effusion as well as bilateral pleural effusions, R > L.  She was started on a levophed gtt for BP management.  Subsequent 2D echo revealed a large effusion with a maximum thickness of 3.2 cm without evidence of collapse of the R ventricle.  She underwent subsequent pericardial drain placement 3/4/19 with initial drain output of 500 ccs per shift.  This eventually improved to approximately 50 ccs within the shift prior to transfer.  Fluid analysis noted 13k WBCs, low pH, and glucose in the 60s; gram stain was negative.  Other labs at the time revealed hypoalbuminemia and hypocomplementemia, and positve ELVER. where there was concern that the underlying effusions were secondary to lupus.  She was initially started on prednisone 30mg and then increased to methylpred 125 mg IV daily.   Neurology was also who performed an EEG which showed cortical irratlibility from the left frontal central parietal region as well as mild encephalopathy, no seizure activity.  She was started on keppra, continued on eslicarbazepine, and discontinued on phenytoin.      She was transferred to [facility] [place]7/[place].  Here she was intimally somnolent but then later awake and alert. She denies any chest pain, shortness of breath, rashes, joint pain or swelling, no raynads, oral or nasal ulcers.   No family history of SLE other autoimmune conditions       Labs showing wct of 7.29 with normal differential, hgb 9.6, plt 347, Cr 0.5, ALT 60, , Alk phos 94, T bili 0.2, albumin 1.8, sed rate 56, CRP 43.5. +ELVER 1:2560 C3 70 and C4 15, RAF negative.   Blood cx pending, CXR showing b/l pleural effusions and cardiomegaly.       Problem list:  Pericarditis with pericardial  effusion  Pleural effusions b/l  transaminitis   Raynauds   +ELVER 2560 (history of low complements although here normal)   +sm/RNP  Elevated CPK , could be lupus myositis vs from seizures     At this time there is suspicion that patient pericarditis with pericardial effusion and pleural effusions are from SLE. She is s/p pericardial drain and has been on solumedrol since 3/4 with now minimal drainage. CT with no acute abnormalities,  decreased to 758 on recheck, UPC 0.37    Treatments thus far:  Solumedrol 125mg IV 3/4- 3/8  Prednisone 60mg 3/9    PLAN:  -please switch to prednisone 60mg daily today   -appreciate neurology recs   -elevated LFTs now downtrending, CPK also elevated to 909 decreased to 758, could be related to seizures at OSH  -labs pending: dsDNA pending  anti-histone (pt on dilantin), APLS labs, UA   -please order TSH Free T4   -left knee aspirated today, pending cellcount, crystals and culture.                Barbara Carpenter MD  Rheumatology  [facility]    RHEUMATOLOGY ATTENDING:  Patient presented, personally interviewed with family present, examined, medical records reviewed.  29 yr old lady w seizure disorder since infancy transferred from [facility] for pericardial & pleural effusions requiring drain. Had some joint pain with swelling in hands and knees and discoloration of hands c/w Raynaud's. Also some hyperpigmentation of hands reported. Started on steroids and w/u revealed +ELVER 1:2560 sp with +Sm & RNP;  ds DNA still pending. C3 & C4 ok at 70 & 15 but apparently were low at [facility]. Has mildly elevated IgM  aCLA but LAC neg; RAF lesvia' CPK elevated x 2 at 909 & 758 (but apparently had seizure in [place]. ESR 56; CBC w mild anemia; & mild thrombocytosis; CMP with alb. 1.7; ; ALT 59; Na 135;   Today has no complaints; Exam today: patient alert, oriented and conversant; has bilateral small non painful knee effusions and hyperpigmentation in several areas.  Rx: Left knee  effusion was aspirated by Dr. Carpenter under my supervision.  Ok to switch to po prednisone as per Dr. Carpenter.  Findings discussed with patient, her family and Dr. Carpenter whose note and assessment I agree gloria

## 2019-03-08 NOTE — PROCEDURES
Performed by: Barbara Carpenter MD  Authorized by: Dr. Brizuela   Consent Done?:  Yes (Verbal)  Indications:  joint swelling and diagnostic evaluation  Procedure site marked: Yes    Timeout: Prior to procedure the correct patient, procedure, and site was verified       Location:    Site:  left knee  Prep: Patient was prepped and draped in usual sterile fashion    Ultrasonic Guidance for needle placement: No  Needle size:  22  Approach:  left lateral knee   Medications: none  Aspirate amount (ml):  1cc  Aspirate characteristics: serous   Lab: Fluid sent for laboratory analysis    Patient tolerance:  Patient tolerated the procedure well with no immediate complications

## 2019-03-08 NOTE — PROGRESS NOTES
03/08/19 1659   Vital Signs   Temp 97.6 °F (36.4 °C)   Temp src Oral   Pulse 84   Heart Rate Source Monitor   Resp 20   SpO2 96 %   Pulse Oximetry Type Intermittent   O2 Device (Oxygen Therapy) room air   /61   MAP (mmHg) 79   BP Location Right arm   BP Method Automatic   Patient Position Lying   Received report from VAN Maldonado RN, [phone].  Patient arrived to floor via stretcher.  Telesitter activated.  Mother at bedside.  Seizure precautions noted.  Patient reported last seizure, January, 2019.  Bed padded.  Brain MRI w wo contrast ordered.  Midsternal dressing clean, dry, and intact from drainage discontinue 3/8/19.  Called Dietary services to transfer dinner meal to room 302.  Urine specimen needed.  Labeled specimen cup at bedside.  NANCY Jaramillo. notified of patient's arrival.

## 2019-03-08 NOTE — SUBJECTIVE & OBJECTIVE
Interval History:  See HPI    Current Facility-Administered Medications   Medication Dose Route Frequency Provider Last Rate Last Dose    acetaminophen tablet 650 mg  650 mg Oral Q4H PRN Rob Ramirez MD   650 mg at 03/07/19 0835    enoxaparin injection 40 mg  40 mg Subcutaneous Daily Rob Ramirez MD   40 mg at 03/07/19 1752    eslicarbazepine Tab 1,600 mg  1,600 mg Oral QHS Ezra Christina MD        methylPREDNISolone sodium succinate injection 125 mg  125 mg Intravenous Daily Marce Lira PA-C   125 mg at 03/08/19 0814    ondansetron disintegrating tablet 8 mg  8 mg Oral Q8H PRN Rob Ramirez MD        ondansetron injection 4 mg  4 mg Intravenous Q8H PRN Rob Ramirez MD   4 mg at 03/07/19 1521    sodium chloride 0.9% flush 3 mL  3 mL Intravenous PRN Rob Ramirez MD         Continuous Infusions:    Review of Systems   Constitutional: Negative for chills, fatigue and fever.   HENT: Negative for congestion, ear pain, facial swelling, hearing loss, tinnitus, trouble swallowing and voice change.    Eyes: Negative for photophobia and visual disturbance.   Respiratory: Positive for shortness of breath. Negative for cough.    Cardiovascular: Positive for chest pain. Negative for palpitations.   Gastrointestinal: Negative for constipation, diarrhea, nausea and vomiting.   Endocrine: Negative for cold intolerance and heat intolerance.   Genitourinary: Negative for difficulty urinating, frequency and urgency.   Musculoskeletal: Negative for back pain and myalgias.   Skin: Negative for color change.   Neurological: Positive for seizures. Negative for dizziness, weakness, numbness and headaches.   Hematological: Negative for adenopathy.   Psychiatric/Behavioral: Negative for decreased concentration and dysphoric mood.     Objective:     Vital Signs (Most Recent):  Temp: 97.4 °F (36.3 °C) (03/08/19 0301)  Pulse: 97 (03/08/19 0853)  Resp: (!) 36 (03/08/19 0853)  BP: 103/69 (03/08/19 0700)  SpO2:  97 % (03/08/19 0853) Vital Signs (24h Range):  Temp:  [97.2 °F (36.2 °C)-97.7 °F (36.5 °C)] 97.4 °F (36.3 °C)  Pulse:  [74-98] 97  Resp:  [13-36] 36  SpO2:  [89 %-100 %] 97 %  BP: ()/(52-75) 103/69     Weight: 55.8 kg (123 lb)  Body mass index is 20.47 kg/m².    Physical Exam   Constitutional: She is oriented to person, place, and time. She appears well-developed and well-nourished.   HENT:   Head: Normocephalic and atraumatic.   Eyes: EOM are normal. Pupils are equal, round, and reactive to light.   Neck: Normal range of motion.   Cardiovascular: Normal rate and regular rhythm.   Pulmonary/Chest: Effort normal and breath sounds normal.   Abdominal: Soft. Bowel sounds are normal.   Musculoskeletal: Normal range of motion.   Neurological: She is oriented to person, place, and time. She has normal strength. She has a normal Finger-Nose-Finger Test.   Reflex Scores:       Tricep reflexes are 2+ on the right side and 2+ on the left side.       Bicep reflexes are 2+ on the right side and 2+ on the left side.       Brachioradialis reflexes are 2+ on the right side and 2+ on the left side.       Patellar reflexes are 2+ on the right side and 2+ on the left side.       Achilles reflexes are 2+ on the right side and 2+ on the left side.  Skin: Skin is warm and dry.   Psychiatric: Her behavior is normal.   Nursing note and vitals reviewed.      NEUROLOGICAL EXAMINATION:     MENTAL STATUS   Oriented to person, place, and time.   Level of consciousness: alert       Patient is soft spoken and mildly withdrawn but fully oriented and answers questions appropriately.     CRANIAL NERVES   Cranial nerves II through XII intact.     CN III, IV, VI   Pupils are equal, round, and reactive to light.  Extraocular motions are normal.     MOTOR EXAM   Muscle bulk: normal  Overall muscle tone: normal    Strength   Strength 5/5 throughout.     REFLEXES     Reflexes   Right brachioradialis: 2+  Left brachioradialis: 2+  Right biceps:  2+  Left biceps: 2+  Right triceps: 2+  Left triceps: 2+  Right patellar: 2+  Left patellar: 2+  Right achilles: 2+  Left achilles: 2+  Right : 2+  Left : 2+    SENSORY EXAM   Light touch normal.     GAIT AND COORDINATION      Coordination   Finger to nose coordination: normal    Tremor   Resting tremor: absent      Significant Labs: All pertinent lab results from the past 24 hours have been reviewed.    Significant Studies: I have reviewed all pertinent imaging results/findings within the past 24 hours.

## 2019-03-08 NOTE — PLAN OF CARE
Problem: Adult Inpatient Plan of Care  Goal: Plan of Care Review  Outcome: Ongoing (interventions implemented as appropriate)  POC reviewed with patient and mother, questions answered, verbalized understanding.  VSS.  Magnesium, Phos, and potassium replaced.  TTE, EEG, and head CT completed.  RERE drain to be pulled.  Pt remains free of falls and injuries.  Will continue to monitor.

## 2019-03-08 NOTE — SUBJECTIVE & OBJECTIVE
No past medical history on file.    No past surgical history on file.    Review of patient's allergies indicates:  No Known Allergies    No medications prior to admission.     Family History     None        Tobacco Use    Smoking status: Not on file   Substance and Sexual Activity    Alcohol use: Not on file    Drug use: Not on file    Sexual activity: Not on file     Review of Systems   All other systems reviewed and are negative.    Objective:     Vital Signs (Most Recent):  Temp: 97.4 °F (36.3 °C) (03/08/19 0301)  Pulse: 97 (03/08/19 0853)  Resp: (!) 36 (03/08/19 0853)  BP: 103/69 (03/08/19 0700)  SpO2: 97 % (03/08/19 0853) Vital Signs (24h Range):  Temp:  [97.2 °F (36.2 °C)-97.7 °F (36.5 °C)] 97.4 °F (36.3 °C)  Pulse:  [74-98] 97  Resp:  [13-36] 36  SpO2:  [89 %-100 %] 97 %  BP: ()/(52-75) 103/69     Weight: 55.8 kg (123 lb)  Body mass index is 20.47 kg/m².    SpO2: 97 %  O2 Device (Oxygen Therapy): room air      Intake/Output Summary (Last 24 hours) at 3/8/2019 1047  Last data filed at 3/8/2019 0800  Gross per 24 hour   Intake 1460 ml   Output 401 ml   Net 1059 ml       Lines/Drains/Airways          None          Physical Exam   Constitutional: She is oriented to person, place, and time. She appears well-nourished. No distress.   HENT:   Head: Atraumatic.   Eyes: EOM are normal. No scleral icterus.   Neck: Neck supple. No JVD present.   Cardiovascular: Normal rate and regular rhythm. Exam reveals friction rub. Exam reveals no gallop.   No murmur heard.  Pulmonary/Chest: Effort normal and breath sounds normal. No respiratory distress. She has no wheezes. She has no rales.   Abdominal: Soft. Bowel sounds are normal. She exhibits no distension. There is no tenderness.   Musculoskeletal: She exhibits no edema.   Neurological: She is alert and oriented to person, place, and time. No cranial nerve deficit.   Skin: Skin is warm and dry. No erythema.   Psychiatric: She has a normal mood and affect.        Significant Labs:   BMP:   Recent Labs   Lab 03/07/19  0607 03/08/19  0409   GLU 89 135*    135*   K 3.4* 4.5    103   CO2 29 28   BUN 7 7   CREATININE 0.5 0.5   CALCIUM 7.7* 8.0*   MG 1.1* 1.7   , CMP   Recent Labs   Lab 03/07/19  0607 03/08/19  0409    135*   K 3.4* 4.5    103   CO2 29 28   GLU 89 135*   BUN 7 7   CREATININE 0.5 0.5   CALCIUM 7.7* 8.0*   PROT 6.7 6.8   ALBUMIN 1.8* 1.7*   BILITOT 0.2 0.1   ALKPHOS 94 92   * 115*   ALT 60* 59*   ANIONGAP 4* 4*   ESTGFRAFRICA >60.0 >60.0   EGFRNONAA >60.0 >60.0   , CBC   Recent Labs   Lab 03/07/19  0607 03/08/19  0409   WBC 7.29 5.31   HGB 9.6* 10.2*   HCT 30.8* 32.6*    369*    and INR   Recent Labs   Lab 03/07/19  0607   INR 1.1       Significant Imaging: Echocardiogram:   2D echo with color flow doppler: No results found for this or any previous visit. and Transthoracic echo (TTE) complete (Cupid Only):   Results for orders placed or performed during the hospital encounter of 03/07/19   Transthoracic echo (TTE) complete (Cupid Only)   Result Value Ref Range    AV mean gradient 3.39 mmHg    Ao peak harjinder 1.24 m/s    Ao VTI 23.00 cm    IVS 0.70 0.6 - 1.1 cm    LA size 2.00 cm    Left Atrium Major Axis 4.74 cm    Left Atrium Minor Axis 4.18 cm    LVIDD 4.12 3.5 - 6.0 cm    LVIDS 2.50 2.1 - 4.0 cm    LVOT diameter 2.01 cm    LVOT peak VTI 18.57 cm    PW 0.75 0.6 - 1.1 cm    MV Peak E Harjinder 1.09 m/s    RA Major Axis 4.42 cm    RA Width 3.34 cm    RVDD 3.55 cm    Sinus 2.88 cm    TR Max Harjinder 2.64 m/s    TDI LATERAL 0.07     TDI SEPTAL 0.09     LA WIDTH 3.18 cm    LV Diastolic Volume 74.98 mL    LV Systolic Volume 22.25 mL    LVOT peak harjinder [id number] m/s    LV LATERAL E/E' RATIO 15.57     LV SEPTAL E/E' RATIO 12.11     FS 39 %    LA volume 24.02 cm3    LV mass 86.19 g    Left Ventricle Relative Wall Thickness 0.36 cm    AV valve area 2.56 cm2    AV Velocity Ratio 0.88     AV index (prosthetic) 0.81     Mean e' 0.08     LVOT  area 3.17 cm2    LVOT stroke volume 58.89 cm3    AV peak gradient 6.15 mmHg    E/E' ratio 13.63     LV Systolic Volume Index 13.8 mL/m2    LV Diastolic Volume Index 46.60 mL/m2    LA Volume Index 14.9 mL/m2    LV Mass Index 53.6 g/m2    Triscuspid Valve Regurgitation Peak Gradient 27.88 mmHg    BSA 1.6 m2    Right Atrial Pressure (from IVC) 3 mmHg    TV rest pulmonary artery pressure 31 mmHg

## 2019-03-08 NOTE — PLAN OF CARE
Pinky Lanier is a 29 y.o. lady with a previous seizure disorder who presents as a transfer from [facility] in [place] for further evaluation for pericardial effusion.  Per chart review, patient was recently admitted to on 2/25/2019 for acute chest pain.  She was diagnosed with acute, likely viral pericarditis and was noted to have moderate pericardial effusion without tamponade physiology.  She was started on colchicine 0.6 mg PO daily and ibuprofen.  She was subsequently discharged on 2/27 in good condition, however she had noticed worsening severe chest pain that was associated with light headedness, dizziness, and dyspnea.  She then presented to [facility] where she was noted to have hypotension with a BP of 90/60.  There, a CT chest revealed a large pericardial effusion as well as bilateral pleural effusions, R > L.  She was started on a levophed gtt for BP management.  Subsequent 2D echo revealed a large effusion with a maximum thickness of 3.2 cm without evidence of collapse of the R ventricle.  She underwent subsequent pericardial drain placement with initial drain output of 500 ccs per shift.  This eventually improved to approximately 50 ccs within the shift prior to transfer.  Fluid analysis noted 13k WBCs, low pH, and glucose in the 60s; gram stain was negative.  Other labs at the time revealed hypoalbuminemia and hypocomplementemia, where there was concern that the underlying effusions were secondary to lupus.  She was started on methylpred 125 mg IV daily.  CBCs at the time mostly revealed an anemia, but otherwise no leukopenia/leukocytosis or thrombocytopenia.  CMP was also unremarkable with normal kidney function.  She was noted there to have an elevated urine protein. Additionally, neurology was consulted, who performed an EEG without any epileptiform activity noted.  She was started on keppra, continued on eslicarbazepine, and discontinued phenytoin.  ELVER and anti-histone reported  returned with elevated ELVER, but none were reported.      She was transferred to [facility] on 3/7/2019.  She notes that she has never had chest pain like this previously. Denies any personal history of skin rashes, light sensitivity rashes, malar rashes, oral ulcers, raynaud's phenomenon, or arthritis.  Denies any family history of lupus or any other rheumatologic disease.  Denies any history of miscarriages or DVTs.  Currently living with her boyfriend.      She was admitted to MICU on 3/7. Pericardial drain pulled today 3/8. Did have some encephalopathy yesterday which has improved today. Unclear if this was 2/2 addition of keppra at OSH vs neuropsych SLE. Neurology consulted and recommended discontinuing Keppra, increasing aptiom to 1600mg/d and MRI. Cardiology following and recommend repeat TTE in 2-3 days. Rheumatology also on board.    IMO to assume care once patient has a floor bed.

## 2019-03-08 NOTE — RESIDENT HANDOFF
Handoff     Primary Team: [facility] CRITICAL CARE MEDICINE Room Number: 6089/6089 A     Patient Name: Pinky Lanier MRN: [id number]     Date of Birth: [id number] Allergies: Patient has no known allergies.     Age: 29 y.o. Admit Date: 3/7/2019     Sex: female  BMI: Body mass index is 20.47 kg/m².     Code Status: Full Code        Illness Level (current clinical status): Watcher - No    Reason for Admission: Pericardial tamponade    Brief HPI (pertinent PMH and diagnosis or differential diagnosis): 29 F presented to outside hospital with chest pain, found to have pericardial effusion 2/2 SLE. S/p pericardiocentesis with drain left in place at OSH.     Procedure Date: Pericardial drain pulled today 3/8    Hospital Course (updated, brief assessment by system or problem, significant events): Admitted to MICU 3/7, stable. Drain pulled 3/8, ready for step down    Tasks (specific, using if-then statements):   Encephalopathy- altered yesterday, improved today. Hx szs, neuro consulted for AED management, encephalopathy in the setting of lupus flair.    Pericardial effusion- repeat 2D Echo in 2-3 days    Transaminitis- f/u hepatitis studies, monitor    SLE- steroids, rheum following      Estimated Discharge Date: 3/12    Discharge Disposition: Home or Self Care    Mentored By: Dr. Henson

## 2019-03-08 NOTE — ASSESSMENT & PLAN NOTE
Pinky Lanier is a 29 year old female with past medical history of seizure disorder (since the age of 1 mos) who presents as a transfer from [facility] in [place] for further evaluation for pericardial effusion and pleural effusions.     Patient was recently admitted to [facility] on 2/25/2019 for acute chest pain.She was diagnosed with acute, likely viral pericarditis and was noted to have moderate pericardial effusion without tamponade physiology.  She was started on colchicine 0.6 mg PO BID and ibuprofen 600mg tid .  She was subsequently discharged on 2/27 in good condition, however she had noticed worsening severe chest pain that was associated with light headedness, dizziness, and dyspnea.    She then presented to [facility] on 3/3 where she was noted to have hypotension with a BP of 90/60.  There, a CT chest revealed a large pericardial effusion as well as bilateral pleural effusions, R > L.  She was started on a levophed gtt for BP management.  Subsequent 2D echo revealed a large effusion with a maximum thickness of 3.2 cm without evidence of collapse of the R ventricle.  She underwent subsequent pericardial drain placement 3/4/19 with initial drain output of 500 ccs per shift.  This eventually improved to approximately 50 ccs within the shift prior to transfer.  Fluid analysis noted 13k WBCs, low pH, and glucose in the 60s; gram stain was negative.  Other labs at the time revealed hypoalbuminemia and hypocomplementemia, and positve ELVER. where there was concern that the underlying effusions were secondary to lupus.  She was initially started on prednisone 30mg and then increased to methylpred 125 mg IV daily.   Neurology was also who performed an EEG which showed cortical irratlibility from the left frontal central parietal region as well as mild encephalopathy, no seizure activity.  She was started on keppra, continued on eslicarbazepine, and discontinued on phenytoin.      She was  transferred to [facility] on 3/7/2019.  Here she was intimally somnolent but then later awake and alert. She denies any chest pain, shortness of breath, rashes, joint pain or swelling, no raynads, oral or nasal ulcers.   No family history of SLE other autoimmune conditions       Labs showing wct of 7.29 with normal differential, hgb 9.6, plt 347, Cr 0.5, ALT 60, , Alk phos 94, T bili 0.2, albumin 1.8, sed rate 56, CRP 43.5. +ELVER 1:2560 C3 70 and C4 15, RAF negative.   Blood cx pending, CXR showing b/l pleural effusions and cardiomegaly.       Problem list:  Pericarditis with pericardial effusion  Pleural effusions b/l  transaminitis   Raynauds   +ELVER 2560 (history of low complements although here normal)   +sm/RNP  Elevated CPK , could be lupus myositis vs from seizures     At this time there is suspicion that patient pericarditis with pericardial effusion and pleural effusions are from SLE. She is s/p pericardial drain and has been on solumedrol since 3/4 with now minimal drainage. CT with no acute abnormalities,  decreased to 758 on recheck, UPC 0.37    Treatments thus far:  Solumedrol 125mg IV 3/4- 3/8  Prednisone 60mg 3/9    PLAN:  -please switch to prednisone 60mg daily today   -appreciate neurology recs   -elevated LFTs now downtrending, CPK also elevated to 909 decreased to 758, could be related to seizures at OSH  -labs pending: dsDNA pending  anti-histone (pt on dilantin), APLS labs, UA   -please order TSH Free T4   -left knee aspirated today, pending cellcount, crystals and culture.

## 2019-03-08 NOTE — PROCEDURES
Interventional Cardiology Procedure Note:    Pericardial drain removed at the bedside without issue. Dressing left in place, can be removed in 24 hrs. Tolerated well.    Cesar Billy MD  Cardiology Fellow PGY-5  Pager: [phone]

## 2019-03-08 NOTE — SUBJECTIVE & OBJECTIVE
Interval History: no acute overnight events. No seizures. Minimal draingage from her pericardial drain     Current Facility-Administered Medications   Medication Frequency    acetaminophen tablet 650 mg Q4H PRN    enoxaparin injection 40 mg Daily    eslicarbazepine Tab 1,600 mg QHS    methylPREDNISolone sodium succinate injection 125 mg Daily    morphine injection 2 mg On Call Procedure    ondansetron disintegrating tablet 8 mg Q8H PRN    ondansetron injection 4 mg Q8H PRN    sodium chloride 0.9% flush 3 mL PRN     Objective:     Vital Signs (Most Recent):  Temp: 97.4 °F (36.3 °C) (03/08/19 0301)  Pulse: 96 (03/08/19 1300)  Resp: (!) 28 (03/08/19 1300)  BP: (!) 110/56 (03/08/19 1300)  SpO2: (!) 94 % (03/08/19 1300)  O2 Device (Oxygen Therapy): room air (03/08/19 1300) Vital Signs (24h Range):  Temp:  [97.2 °F (36.2 °C)-97.7 °F (36.5 °C)] 97.4 °F (36.3 °C)  Pulse:  [] 96  Resp:  [13-36] 28  SpO2:  [89 %-100 %] 94 %  BP: ()/(52-75) 110/56     Weight: 55.8 kg (123 lb) (03/07/19 1200)  Body mass index is 20.47 kg/m².  Body surface area is 1.6 meters squared.      Intake/Output Summary (Last 24 hours) at 3/8/2019 1320  Last data filed at 3/8/2019 0900  Gross per 24 hour   Intake 1580 ml   Output 501 ml   Net 1079 ml       Physical Exam   Constitutional: She is oriented to person, place, and time and well-developed, well-nourished, and in no distress. No distress.   HENT:   Head: Normocephalic and atraumatic.   Mouth/Throat: Oropharynx is clear and moist. No oropharyngeal exudate.   Eyes: Conjunctivae and EOM are normal. Pupils are equal, round, and reactive to light. No scleral icterus.   Neck: Normal range of motion. Neck supple.   Cardiovascular: Normal rate and regular rhythm.    Mild friction rub    Pulmonary/Chest: She has no wheezes.   Decreased breath sounds in bases   Pericardial drain in place    Abdominal: Soft. Bowel sounds are normal. She exhibits no distension.   Lymphadenopathy:     She  has no cervical adenopathy.   Neurological: She is alert and oriented to person, place, and time.   Skin: Skin is warm and dry. She is not diaphoretic.     Hyperpigmented rash on LE b/l    Musculoskeletal: Normal range of motion. She exhibits no edema.   No synovitis in any joint   Mild left knee effusion, knee is not warm or erythematous          Significant Labs:  BMP:   Recent Labs   Lab 03/08/19  0409   *   *   K 4.5      CO2 28   BUN 7   CREATININE 0.5   CALCIUM 8.0*   MG 1.7     CBC:   Recent Labs   Lab 03/08/19  0409   WBC 5.31   HGB 10.2*   HCT 32.6*   *       Significant Imaging:  Imaging results within the past 24 hours have been reviewed.

## 2019-03-08 NOTE — ASSESSMENT & PLAN NOTE
Patient is currently responding well to aptiom although this may be due to honeymoon period, would recommend optimizing this medication and stopping keppra to simplify regiment.  She will need outpatient follow up with epilepsy specialist for further consideration of resective epilepsy surgery or vagal nerve stimulator.  Given frequent convulsions and new onset cardiac issues, she will be at high risk for sudden death.  Although recent seizure frequency has not changes, would recommend MRI brain to assess for possible CNS lupus.    -  Increase Aptiom to 1600mg/d and stop Keppra  -  MRI brain with and without

## 2019-03-08 NOTE — PLAN OF CARE
Problem: Adult Inpatient Plan of Care  Goal: Plan of Care Review  Outcome: Ongoing (interventions implemented as appropriate)  POC reviewed with patient and mother, questions answered, verbalized understanding.  VSS.  Pericardial drain removed, patient tolerated well.  Pt remains free of falls and injuries.  Will continue to monitor.

## 2019-03-08 NOTE — PLAN OF CARE
Problem: Adult Inpatient Plan of Care  Goal: Plan of Care Review  Outcome: Ongoing (interventions implemented as appropriate)    No acute events throughout day. See vital signs and assessments in flowsheets. See below for updates on today's progress.      Pulmonary: Pt on 2L NC sats maintained > 88%    Cardiovascular: HR 70-90 NSR, BP /50-70. Pulses 2+/2+    Neurological: Pt AAOx4, afebrile throughout shift. PERRL 2-3mm. Follows commands    Gastrointestinal: Regular diet, 2 BM on shift, liquid    Genitourinary: Voids spontaneously    Integumentary/Other: skin intact, drain to mediastinum put out 30cc/shift    Patient progressing towards goals as tolerated, plan of care communicated and reviewed with Pinky Lanier and family. All concerns addressed. Will continue to monitor.

## 2019-03-08 NOTE — PROGRESS NOTES
[facility]  Neurology-Epilepsy  Progress Note    Patient Name: Pinky Lanier  MRN: [id number]  Admission Date: 3/7/2019  Hospital Length of Stay: 1 days  Code Status: Full Code   Attending Provider: Desiree Henson MD  Primary Care Physician: Primary Doctor No   Principal Problem:Pericardial tamponade    Subjective:     Hospital Course:   29 year old woman with history of epilepsy since infancy transferred from [place] after admit there for pericardial effusion and + ELVER, the patient has mild cognitive impairment at baseline and history is taken primarily through her mother    Patient had initial seizure at [age] month of age and has been tried on numerous medications (LTG, LEV, ZNS, CBZ, VPA - doses unknown) with longest seizure free period being about a year from ages [age], in recent years she has not gone more than two months without a seizure.  All seizures are secondarily generalized and previously occurred in the waking state but more recently in sleep as well.  Mother reports multiple prior EMU admits in [place], most recent approximately five years ago, and was told that she was not a surgical candidate but unsure of rationale.    She was most recently on PHT and LCS with change of LCS->ESL at current dose of 1200mg/d in 12/2018.  She has been tolerating ESL well with only 1 seizure in January since it was started.  Following admit in [place], PHT was changed to low dose LEV.      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  She lives with her boyfriend and does not have children.    Interval History:  See HPI    Current Facility-Administered Medications   Medication Dose Route Frequency Provider Last Rate Last Dose    acetaminophen tablet 650 mg  650 mg Oral Q4H PRN Rob Ramirez MD   650 mg at 03/07/19 0835    enoxaparin injection 40 mg  40 mg Subcutaneous Daily Rob Ramirez MD   40 mg at 03/07/19  1752    eslicarbazepine Tab 1,600 mg  1,600 mg Oral QHS Ezra Christina MD        methylPREDNISolone sodium succinate injection 125 mg  125 mg Intravenous Daily Marce Lira PA-C   125 mg at 03/08/19 0814    ondansetron disintegrating tablet 8 mg  8 mg Oral Q8H PRN Rob Ramirez MD        ondansetron injection 4 mg  4 mg Intravenous Q8H PRN Rob Ramirez MD   4 mg at 03/07/19 1521    sodium chloride 0.9% flush 3 mL  3 mL Intravenous PRN Rob Ramirez MD         Continuous Infusions:    Review of Systems   Constitutional: Negative for chills, fatigue and fever.   HENT: Negative for congestion, ear pain, facial swelling, hearing loss, tinnitus, trouble swallowing and voice change.    Eyes: Negative for photophobia and visual disturbance.   Respiratory: Positive for shortness of breath. Negative for cough.    Cardiovascular: Positive for chest pain. Negative for palpitations.   Gastrointestinal: Negative for constipation, diarrhea, nausea and vomiting.   Endocrine: Negative for cold intolerance and heat intolerance.   Genitourinary: Negative for difficulty urinating, frequency and urgency.   Musculoskeletal: Negative for back pain and myalgias.   Skin: Negative for color change.   Neurological: Positive for seizures. Negative for dizziness, weakness, numbness and headaches.   Hematological: Negative for adenopathy.   Psychiatric/Behavioral: Negative for decreased concentration and dysphoric mood.     Objective:     Vital Signs (Most Recent):  Temp: 97.4 °F (36.3 °C) (03/08/19 0301)  Pulse: 97 (03/08/19 0853)  Resp: (!) 36 (03/08/19 0853)  BP: 103/69 (03/08/19 0700)  SpO2: 97 % (03/08/19 0853) Vital Signs (24h Range):  Temp:  [97.2 °F (36.2 °C)-97.7 °F (36.5 °C)] 97.4 °F (36.3 °C)  Pulse:  [74-98] 97  Resp:  [13-36] 36  SpO2:  [89 %-100 %] 97 %  BP: ()/(52-75) 103/69     Weight: 55.8 kg (123 lb)  Body mass index is 20.47 kg/m².    Physical Exam   Constitutional: She is oriented to person,  place, and time. She appears well-developed and well-nourished.   HENT:   Head: Normocephalic and atraumatic.   Eyes: EOM are normal. Pupils are equal, round, and reactive to light.   Neck: Normal range of motion.   Cardiovascular: Normal rate and regular rhythm.   Pulmonary/Chest: Effort normal and breath sounds normal.   Abdominal: Soft. Bowel sounds are normal.   Musculoskeletal: Normal range of motion.   Neurological: She is oriented to person, place, and time. She has normal strength. She has a normal Finger-Nose-Finger Test.   Reflex Scores:       Tricep reflexes are 2+ on the right side and 2+ on the left side.       Bicep reflexes are 2+ on the right side and 2+ on the left side.       Brachioradialis reflexes are 2+ on the right side and 2+ on the left side.       Patellar reflexes are 2+ on the right side and 2+ on the left side.       Achilles reflexes are 2+ on the right side and 2+ on the left side.  Skin: Skin is warm and dry.   Psychiatric: Her behavior is normal.   Nursing note and vitals reviewed.      NEUROLOGICAL EXAMINATION:     MENTAL STATUS   Oriented to person, place, and time.   Level of consciousness: alert       Patient is soft spoken and mildly withdrawn but fully oriented and answers questions appropriately.     CRANIAL NERVES   Cranial nerves II through XII intact.     CN III, IV, VI   Pupils are equal, round, and reactive to light.  Extraocular motions are normal.     MOTOR EXAM   Muscle bulk: normal  Overall muscle tone: normal    Strength   Strength 5/5 throughout.     REFLEXES     Reflexes   Right brachioradialis: 2+  Left brachioradialis: 2+  Right biceps: 2+  Left biceps: 2+  Right triceps: 2+  Left triceps: 2+  Right patellar: 2+  Left patellar: 2+  Right achilles: 2+  Left achilles: 2+  Right : 2+  Left : 2+    SENSORY EXAM   Light touch normal.     GAIT AND COORDINATION      Coordination   Finger to nose coordination: normal    Tremor   Resting tremor:  absent      Significant Labs: All pertinent lab results from the past 24 hours have been reviewed.    Significant Studies: I have reviewed all pertinent imaging results/findings within the past 24 hours.    Assessment and Plan:     Seizure disorder    Patient is currently responding well to aptiom although this may be due to honeymoon period, would recommend optimizing this medication and stopping keppra to simplify regiment.  She will need outpatient follow up with epilepsy specialist for further consideration of resective epilepsy surgery or vagal nerve stimulator.  Given frequent convulsions and new onset cardiac issues, she will be at high risk for sudden death.  Although recent seizure frequency has not changes, would recommend MRI brain to assess for possible CNS lupus.    -  Increase Aptiom to 1600mg/d and stop Keppra  -  MRI brain with and without           VTE Risk Mitigation (From admission, onward)        Ordered     enoxaparin injection 40 mg  Daily      03/07/19 1040     IP VTE HIGH RISK PATIENT  Once      03/07/19 0618     Place sequential compression device  Until discontinued      03/07/19 0618          Ezra Christina MD  Neurology-Epilepsy  [facility]

## 2019-03-08 NOTE — CONSULTS
[facility]  Interventional Cardiology  Consult Note    Patient Name: Pinky Lanier  MRN: [id number]  Admission Date: 3/7/2019  Hospital Length of Stay: 1 days  Code Status: Full Code   Attending Provider: Desiree Henson MD  Consulting Provider: Cesar Billy MD  Primary Care Physician: Primary Doctor No  Principal Problem:Pericardial tamponade    Patient information was obtained from patient and past medical records.     Inpatient consult to Interventional Cardiology  Consult performed by: Cesar Billy MD  Consult ordered by: Marce Lira PA-C        Subjective:     Chief Complaint:  Chest pain     HPI:  Ms. Lanier is a 30 yo woman with hx of seizure d/o transferred from [facility] after presenting with tamponade. She was admitted 2/25 with chest pain; found to have mod pericardial effusion and managed as acute, likely viral pericarditis with colchicine and ibuprofen. She was discharged on 2/27 but retruned with worsening chest pain, LH/dizziness, dyspnea. She was hypotensive when she returned. A CT chest revealed a large pericardial effusion as well as bilateral pleural effusions, R > L.  TTE showed a large effusion, maximal thickness 3.2 cm without collapse of the R ventricle. She underwent pericardiocentesis and drain placement on 3/4. Per report, initially with 500 ccs/shift serosanguinous output, improved to 50 ccs shift prior to transfer here yesterday AM. Labs with positive ELVER, hypoalbuminemia, hypocomplementemia concerning for autoimmune etiology and she has been started on methylprednisolone. Over last 24 hrs, she had 30 cc output. TTE here with trivial pericardial effusion.      No past medical history on file.    No past surgical history on file.    Review of patient's allergies indicates:  No Known Allergies    No medications prior to admission.     Family History     None        Tobacco Use    Smoking status: Not on file   Substance and Sexual  Activity    Alcohol use: Not on file    Drug use: Not on file    Sexual activity: Not on file     Review of Systems   All other systems reviewed and are negative.    Objective:     Vital Signs (Most Recent):  Temp: 97.4 °F (36.3 °C) (03/08/19 0301)  Pulse: 97 (03/08/19 0853)  Resp: (!) 36 (03/08/19 0853)  BP: 103/69 (03/08/19 0700)  SpO2: 97 % (03/08/19 0853) Vital Signs (24h Range):  Temp:  [97.2 °F (36.2 °C)-97.7 °F (36.5 °C)] 97.4 °F (36.3 °C)  Pulse:  [74-98] 97  Resp:  [13-36] 36  SpO2:  [89 %-100 %] 97 %  BP: ()/(52-75) 103/69     Weight: 55.8 kg (123 lb)  Body mass index is 20.47 kg/m².    SpO2: 97 %  O2 Device (Oxygen Therapy): room air      Intake/Output Summary (Last 24 hours) at 3/8/2019 1047  Last data filed at 3/8/2019 0800  Gross per 24 hour   Intake 1460 ml   Output 401 ml   Net 1059 ml       Lines/Drains/Airways          None          Physical Exam   Constitutional: She is oriented to person, place, and time. She appears well-nourished. No distress.   HENT:   Head: Atraumatic.   Eyes: EOM are normal. No scleral icterus.   Neck: Neck supple. No JVD present.   Cardiovascular: Normal rate and regular rhythm. Exam reveals friction rub. Exam reveals no gallop.   No murmur heard.  Pulmonary/Chest: Effort normal and breath sounds normal. No respiratory distress. She has no wheezes. She has no rales.   Abdominal: Soft. Bowel sounds are normal. She exhibits no distension. There is no tenderness.   Musculoskeletal: She exhibits no edema.   Neurological: She is alert and oriented to person, place, and time. No cranial nerve deficit.   Skin: Skin is warm and dry. No erythema.   Psychiatric: She has a normal mood and affect.       Significant Labs:   BMP:   Recent Labs   Lab 03/07/19  0607 03/08/19  0409   GLU 89 135*    135*   K 3.4* 4.5    103   CO2 29 28   BUN 7 7   CREATININE 0.5 0.5   CALCIUM 7.7* 8.0*   MG 1.1* 1.7   , CMP   Recent Labs   Lab 03/07/19  0607 03/08/19  0409    135*    K 3.4* 4.5    103   CO2 29 28   GLU 89 135*   BUN 7 7   CREATININE 0.5 0.5   CALCIUM 7.7* 8.0*   PROT 6.7 6.8   ALBUMIN 1.8* 1.7*   BILITOT 0.2 0.1   ALKPHOS 94 92   * 115*   ALT 60* 59*   ANIONGAP 4* 4*   ESTGFRAFRICA >60.0 >60.0   EGFRNONAA >60.0 >60.0   , CBC   Recent Labs   Lab 03/07/19  0607 03/08/19  0409   WBC 7.29 5.31   HGB 9.6* 10.2*   HCT 30.8* 32.6*    369*    and INR   Recent Labs   Lab 03/07/19  0607   INR 1.1       Significant Imaging: Echocardiogram:   2D echo with color flow doppler: No results found for this or any previous visit. and Transthoracic echo (TTE) complete (Cupid Only):   Results for orders placed or performed during the hospital encounter of 03/07/19   Transthoracic echo (TTE) complete (Cupid Only)   Result Value Ref Range    AV mean gradient 3.39 mmHg    Ao peak harjinder 1.24 m/s    Ao VTI 23.00 cm    IVS 0.70 0.6 - 1.1 cm    LA size 2.00 cm    Left Atrium Major Axis 4.74 cm    Left Atrium Minor Axis 4.18 cm    LVIDD 4.12 3.5 - 6.0 cm    LVIDS 2.50 2.1 - 4.0 cm    LVOT diameter 2.01 cm    LVOT peak VTI 18.57 cm    PW 0.75 0.6 - 1.1 cm    MV Peak E Harjinder 1.09 m/s    RA Major Axis 4.42 cm    RA Width 3.34 cm    RVDD 3.55 cm    Sinus 2.88 cm    TR Max Harjinder 2.64 m/s    TDI LATERAL 0.07     TDI SEPTAL 0.09     LA WIDTH 3.18 cm    LV Diastolic Volume 74.98 mL    LV Systolic Volume 22.25 mL    LVOT peak harjinder [id number] m/s    LV LATERAL E/E' RATIO 15.57     LV SEPTAL E/E' RATIO 12.11     FS 39 %    LA volume 24.02 cm3    LV mass 86.19 g    Left Ventricle Relative Wall Thickness 0.36 cm    AV valve area 2.56 cm2    AV Velocity Ratio 0.88     AV index (prosthetic) 0.81     Mean e' 0.08     LVOT area 3.17 cm2    LVOT stroke volume 58.89 cm3    AV peak gradient 6.15 mmHg    E/E' ratio 13.63     LV Systolic Volume Index 13.8 mL/m2    LV Diastolic Volume Index 46.60 mL/m2    LA Volume Index 14.9 mL/m2    LV Mass Index 53.6 g/m2    Triscuspid Valve Regurgitation Peak Gradient  27.88 mmHg    BSA 1.6 m2    Right Atrial Pressure (from IVC) 3 mmHg    TV rest pulmonary artery pressure 31 mmHg     Assessment and Plan:     Pericardial effusion    29F with pericardial effusion likely autoimmune in etiology here s/p pericardial drain. 30 cc output over last 24 hrs. TTE with trivial pericardial effusion.  -Pericardial drain removal at the bedside  -Repeat limited TTE with color flow doppler for re-evaluation of effusion in 2-3 days       Cesar Billy MD  Interventional Cardiology   [facility]

## 2019-03-08 NOTE — ASSESSMENT & PLAN NOTE
29F with pericardial effusion likely autoimmune in etiology here s/p pericardial drain. 30 cc output over last 24 hrs. TTE with trivial pericardial effusion.  -Pericardial drain removal at the bedside  -Repeat limited TTE with color flow doppler for re-evaluation of effusion in 2-3 days

## 2019-03-08 NOTE — HPI
29 year old woman with history of epilepsy since infancy transferred from [place] after admit there for pericardial effusion and + ELVER, the patient has mild cognitive impairment at baseline and history is taken primarily through her mother    Patient had initial seizure at [age] month of age and has been tried on numerous medications (LTG, LEV, ZNS, CBZ, VPA - doses unknown) with longest seizure free period being about a year from ages [age], in recent years she has not gone more than two months without a seizure.  All seizures are secondarily generalized and previously occurred in the waking state but more recently in sleep as well.  Mother reports multiple prior EMU admits in [place], most recent approximately five years ago, and was told that she was not a surgical candidate but unsure of rationale.    She was most recently on PHT and LCS with change of LCS->ESL at current dose of 1200mg/d in 12/2018.  She has been tolerating ESL well with only 1 seizure in January since it was started.  Following admit in [place], PHT was changed to low dose LEV.      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  She lives with her boyfriend and does not have children.

## 2019-03-08 NOTE — HPI
Ms. Lanier is a 30 yo woman with hx of seizure d/o transferred from [facility] after presenting with tamponade. She was admitted 2/25 with chest pain; found to have mod pericardial effusion and managed as acute, likely viral pericarditis with colchicine and ibuprofen. She was discharged on 2/27 but retruned with worsening chest pain, LH/dizziness, dyspnea. She was hypotensive when she returned. A CT chest revealed a large pericardial effusion as well as bilateral pleural effusions, R > L. TTE showed a large effusion, maximal thickness 3.2 cm without collapse of the R ventricle. She underwent pericardiocentesis and drain placement on 3/4. Per report, initially with 500 ccs/shift serosanguinous output, improved to 50 ccs shift prior to transfer here yesterday AM. Labs with positive ELVER, hypoalbuminemia, hypocomplementemia concerning for autoimmune etiology and she has been started on methylprednisolone. Over last 24 hrs, she had 30 cc output. TTE here with trivial pericardial effusion.

## 2019-03-09 LAB
ALBUMIN SERPL BCP-MCNC: 1.7 G/DL
ALDOLASE SERPL-CCNC: 19.7 U/L
ALP SERPL-CCNC: 96 U/L
ALT SERPL W/O P-5'-P-CCNC: 47 U/L
ANION GAP SERPL CALC-SCNC: 3 MMOL/L
AST SERPL-CCNC: 91 U/L
B2 GLYCOPROT1 IGA SER QL: 23 SAU
B2 GLYCOPROT1 IGG SER QL: <9 SGU
B2 GLYCOPROT1 IGM SER QL: 12 SMU
BASOPHILS # BLD AUTO: 0.01 K/UL
BASOPHILS NFR BLD: 0.1 %
BILIRUB SERPL-MCNC: 0.1 MG/DL
BUN SERPL-MCNC: 9 MG/DL
CALCIUM SERPL-MCNC: 7.9 MG/DL
CHLORIDE SERPL-SCNC: 103 MMOL/L
CO2 SERPL-SCNC: 31 MMOL/L
CREAT SERPL-MCNC: 0.5 MG/DL
DIFFERENTIAL METHOD: ABNORMAL
EOSINOPHIL # BLD AUTO: 0.2 K/UL
EOSINOPHIL NFR BLD: 1.7 %
ERYTHROCYTE [DISTWIDTH] IN BLOOD BY AUTOMATED COUNT: 14.7 %
EST. GFR  (AFRICAN AMERICAN): >60 ML/MIN/1.73 M^2
EST. GFR  (NON AFRICAN AMERICAN): >60 ML/MIN/1.73 M^2
GLUCOSE SERPL-MCNC: 91 MG/DL
HCT VFR BLD AUTO: 27.9 %
HGB BLD-MCNC: 8.6 G/DL
HISTONE IGG SER IA-ACNC: 5.4 UNITS (ref 0–0.9)
IMM GRANULOCYTES # BLD AUTO: 0.05 K/UL
IMM GRANULOCYTES NFR BLD AUTO: 0.6 %
LYMPHOCYTES # BLD AUTO: 2.2 K/UL
LYMPHOCYTES NFR BLD: 25.3 %
MAGNESIUM SERPL-MCNC: 1 MG/DL
MCH RBC QN AUTO: 30 PG
MCHC RBC AUTO-ENTMCNC: 30.8 G/DL
MCV RBC AUTO: 97 FL
MONOCYTES # BLD AUTO: 0.8 K/UL
MONOCYTES NFR BLD: 8.7 %
NEUTROPHILS # BLD AUTO: 5.6 K/UL
NEUTROPHILS NFR BLD: 63.6 %
NRBC BLD-RTO: 0 /100 WBC
PHOSPHATE SERPL-MCNC: 2.9 MG/DL
PLATELET # BLD AUTO: 318 K/UL
PMV BLD AUTO: 10 FL
POTASSIUM SERPL-SCNC: 3.3 MMOL/L
PROT SERPL-MCNC: 6 G/DL
RBC # BLD AUTO: 2.87 M/UL
SODIUM SERPL-SCNC: 137 MMOL/L
WBC # BLD AUTO: 8.73 K/UL

## 2019-03-09 PROCEDURE — 80053 COMPREHEN METABOLIC PANEL: CPT

## 2019-03-09 PROCEDURE — 83735 ASSAY OF MAGNESIUM: CPT

## 2019-03-09 PROCEDURE — 63600175 PHARM REV CODE 636 W HCPCS

## 2019-03-09 PROCEDURE — 25000003 PHARM REV CODE 250: Performed by: INTERNAL MEDICINE

## 2019-03-09 PROCEDURE — 20600001 HC STEP DOWN PRIVATE ROOM

## 2019-03-09 PROCEDURE — 84100 ASSAY OF PHOSPHORUS: CPT

## 2019-03-09 PROCEDURE — 99232 SBSQ HOSP IP/OBS MODERATE 35: CPT | Mod: GC,,, | Performed by: INTERNAL MEDICINE

## 2019-03-09 PROCEDURE — 99233 PR SUBSEQUENT HOSPITAL CARE,LEVL III: ICD-10-PCS | Mod: ,,, | Performed by: INTERNAL MEDICINE

## 2019-03-09 PROCEDURE — 25000003 PHARM REV CODE 250: Performed by: HOSPITALIST

## 2019-03-09 PROCEDURE — 99233 SBSQ HOSP IP/OBS HIGH 50: CPT | Mod: ,,, | Performed by: INTERNAL MEDICINE

## 2019-03-09 PROCEDURE — 25000003 PHARM REV CODE 250: Performed by: PSYCHIATRY & NEUROLOGY

## 2019-03-09 PROCEDURE — 94761 N-INVAS EAR/PLS OXIMETRY MLT: CPT

## 2019-03-09 PROCEDURE — 63600175 PHARM REV CODE 636 W HCPCS: Performed by: HOSPITALIST

## 2019-03-09 PROCEDURE — 85025 COMPLETE CBC W/AUTO DIFF WBC: CPT

## 2019-03-09 PROCEDURE — 99232 PR SUBSEQUENT HOSPITAL CARE,LEVL II: ICD-10-PCS | Mod: GC,,, | Performed by: INTERNAL MEDICINE

## 2019-03-09 RX ORDER — LANOLIN ALCOHOL/MO/W.PET/CERES
800 CREAM (GRAM) TOPICAL ONCE
Status: COMPLETED | OUTPATIENT
Start: 2019-03-09 | End: 2019-03-09

## 2019-03-09 RX ORDER — POTASSIUM CHLORIDE 20 MEQ/1
40 TABLET, EXTENDED RELEASE ORAL ONCE
Status: COMPLETED | OUTPATIENT
Start: 2019-03-09 | End: 2019-03-09

## 2019-03-09 RX ADMIN — PREDNISONE 60 MG: 20 TABLET ORAL at 08:03

## 2019-03-09 RX ADMIN — POTASSIUM CHLORIDE 40 MEQ: 1500 TABLET, EXTENDED RELEASE ORAL at 08:03

## 2019-03-09 RX ADMIN — ENOXAPARIN SODIUM 40 MG: 100 INJECTION SUBCUTANEOUS at 06:03

## 2019-03-09 RX ADMIN — ESLICARBAZEPINE ACETATE 1600 MG: 400 TABLET ORAL at 08:03

## 2019-03-09 RX ADMIN — MAGNESIUM OXIDE TAB 400 MG (241.3 MG ELEMENTAL MG) 800 MG: 400 (241.3 MG) TAB at 10:03

## 2019-03-09 NOTE — PROGRESS NOTES
Pt in MRI:Nurse called and given Tele Monitor TXX number / pt off Tele and to MRI table with monitor / AAO w/ NAD noted

## 2019-03-09 NOTE — PROGRESS NOTES
Hospital Medicine   Progress note   [place] [facility] Hui Ferguson MD   Admit Date: 3/7/2019   DOLORES    Code status: Full Code   Principal Problem: Pericardial tamponade     Hospital Course  Pinky Lanier is a 29 y.o. lady with a previous seizure disorder who presents as a transfer from [facility] in [place] for further evaluation for pericardial effusion.  Per chart review, patient was recently admitted to on 2/25/2019 for acute chest pain.  She was diagnosed with acute, likely viral pericarditis and was noted to have moderate pericardial effusion without tamponade physiology.  She was started on colchicine 0.6 mg PO daily and ibuprofen.  She was subsequently discharged on 2/27 in good condition, however she had noticed worsening severe chest pain that was associated with light headedness, dizziness, and dyspnea.  She then presented to [facility] where she was noted to have hypotension with a BP of 90/60.  There, a CT chest revealed a large pericardial effusion as well as bilateral pleural effusions, R > L.  She was started on a levophed gtt for BP management.  Subsequent 2D echo revealed a large effusion with a maximum thickness of 3.2 cm without evidence of collapse of the R ventricle.  She underwent subsequent pericardial drain placement with initial drain output of 500 ccs per shift.  This eventually improved to approximately 50 ccs within the shift prior to transfer.  Fluid analysis noted 13k WBCs, low pH, and glucose in the 60s; gram stain was negative.  Other labs at the time revealed hypoalbuminemia and hypocomplementemia, where there was concern that the underlying effusions were secondary to lupus.  She was started on methylpred 125 mg IV daily.  CBCs at the time mostly revealed an anemia, but otherwise no leukopenia/leukocytosis or thrombocytopenia.  CMP was also unremarkable with normal kidney function.  She was noted there to have an elevated urine protein. Additionally, neurology  was consulted, who performed an EEG without any epileptiform activity noted.  She was started on keppra, continued on eslicarbazepine, and discontinued phenytoin.  ELVER and anti-histone reported returned with elevated ELVER, but none were reported.      She was transferred to [facility] on 3/7/2019.  She notes that she has never had chest pain like this previously. Denies any personal history of skin rashes, light sensitivity rashes, malar rashes, oral ulcers, raynaud's phenomenon, or arthritis.  Denies any family history of lupus or any other rheumatologic disease.  Denies any history of miscarriages or DVTs.  Currently living with her boyfriend.       She was admitted to MICU on 3/7. Pericardial drain pulled today 3/8. Did have some encephalopathy yesterday which has improved today. Unclear if this was 2/2 addition of keppra at OSH vs neuropsych SLE. Neurology consulted and recommended discontinuing Keppra, increasing aptiom to 1600mg/d and MRI. Cardiology following and recommend repeat TTE in 2-3 days. Rheumatology also on board.    Interval hx: Patient transferred out of ICU 3/8/19.  No events overnight.  Patient notes low appetite.  Denies any shortness of breath or chest pain. Mental status completely normal today.    ROS   Constitutional: Negative for chills, fever. +fatigue  HENT: Negative for sore throat, trouble swallowing.    Eyes: Negative for photophobia, visual disturbance.   Respiratory: Negative for cough, shortness of breath.    Cardiovascular: Negative for palpitations, leg swelling. +chest pain  Gastrointestinal: Negative for abdominal pain, constipation, diarrhea, nausea, vomiting.   Endocrine: Negative for cold intolerance, heat intolerance.   Genitourinary: Negative for dysuria, frequency.   Musculoskeletal: Negative for myalgias. +arthralgias  Skin: Negative for rash, wound, erythema   Neurological: Negative for dizziness, syncope, weakness, light-headedness.   Psychiatric/Behavioral: Negative for  confusion, hallucinations, anxiety  All other systems reviewed and are negative.    PEx   Temp:  [97.5 °F (36.4 °C)-98 °F (36.7 °C)]   Pulse:  []   Resp:  [14-21]   BP: (101-115)/(55-71)   SpO2:  [93 %-100 %]      I & O (Last 24H):     Intake/Output Summary (Last 24 hours) at 3/9/2019 1506  Last data filed at 3/9/2019 0600  Gross per 24 hour   Intake 480 ml   Output 25 ml   Net 455 ml       General appearance: no distress, alert and oriented x 3  HEENT:  conjunctivae/corneas clear, PERRL, mucous membranes moist   Neck: supple, thyroid not enlarged  Pulm:   normal respiratory effort, CTAB, no wheezes, rales or rhonchi  Card: RRR, S1, S2 normal, no murmur, click, rub or gallop  Abd: soft, NT, ND, BS present; no masses, no organomegaly  Ext:  Mild effusion noted in bilateral knees, no warmth or erythema noted  Pulses: 2+, symmetric  Skin:  Few hyperpigmented areas noted on bilateral lower extremities  Neuro: CN II-XII grossly intact, no focal numbness or weakness, normal strength and tone   Psych: normal mood and affect      Recent Results (from the past 24 hour(s))   Body fluid crystal Joint Fluid, Left Knee    Collection Time: 03/08/19  5:46 PM   Result Value Ref Range    Body Fluid Source, Crystal Exam Joint Fluid, Left Knee     Body Fluid Crystal Negative Negative   Culture, Body Fluid - Bactec    Collection Time: 03/08/19  5:46 PM   Result Value Ref Range    Body Fluid Culture, Sterile No Growth to date    WBC & Diff,Body Fluid Joint Fluid, Left Knee    Collection Time: 03/08/19  5:49 PM   Result Value Ref Range    Body Fluid Type Joint Fluid, Left Knee     Fluid Appearance Cloudy     Fluid Color Colorless     WBC, Body Fluid 2100 /cu mm    Segs, Fluid 9 %    Lymphs, Fluid 83 %    Monocytes/Macrophages, Fluid 8 %   T4, free    Collection Time: 03/08/19  6:31 PM   Result Value Ref Range    Free T4 0.49 (L) 0.71 - 1.51 ng/dL   TSH    Collection Time: 03/08/19  6:31 PM   Result Value Ref Range    TSH 1.933  0.400 - 4.000 uIU/mL   Urinalysis    Collection Time: 03/08/19  7:35 PM   Result Value Ref Range    Specimen UA Urine, Unspecified     Color, UA Yellow Yellow, Straw, Charlene    Appearance, UA Clear Clear    pH, UA 8.0 5.0 - 8.0    Specific Gravity, UA 1.020 1.005 - 1.030    Protein, UA Negative Negative    Glucose, UA Negative Negative    Ketones, UA Negative Negative    Bilirubin (UA) Negative Negative    Occult Blood UA Negative Negative    Nitrite, UA Negative Negative    Leukocytes, UA Trace (A) Negative   Urinalysis Microscopic    Collection Time: 03/08/19  7:35 PM   Result Value Ref Range    RBC, UA 0 0 - 4 /hpf    WBC, UA 3 0 - 5 /hpf    Bacteria, UA Rare None-Occ /hpf    Squam Epithel, UA 1 /hpf    Microscopic Comment SEE COMMENT    Comprehensive metabolic panel    Collection Time: 03/09/19  5:28 AM   Result Value Ref Range    Sodium 137 136 - 145 mmol/L    Potassium 3.3 (L) 3.5 - 5.1 mmol/L    Chloride 103 95 - 110 mmol/L    CO2 31 (H) 23 - 29 mmol/L    Glucose 91 70 - 110 mg/dL    BUN, Bld 9 6 - 20 mg/dL    Creatinine 0.5 0.5 - 1.4 mg/dL    Calcium 7.9 (L) 8.7 - 10.5 mg/dL    Total Protein 6.0 6.0 - 8.4 g/dL    Albumin 1.7 (L) 3.5 - 5.2 g/dL    Total Bilirubin 0.1 0.1 - 1.0 mg/dL    Alkaline Phosphatase 96 55 - 135 U/L    AST 91 (H) 10 - 40 U/L    ALT 47 (H) 10 - 44 U/L    Anion Gap 3 (L) 8 - 16 mmol/L    eGFR if African American >60.0 >60 mL/min/1.73 m^2    eGFR if non African American >60.0 >60 mL/min/1.73 m^2   Magnesium    Collection Time: 03/09/19  5:28 AM   Result Value Ref Range    Magnesium 1.0 (L) 1.6 - 2.6 mg/dL   Phosphorus    Collection Time: 03/09/19  5:28 AM   Result Value Ref Range    Phosphorus 2.9 2.7 - 4.5 mg/dL   CBC auto differential    Collection Time: 03/09/19  5:28 AM   Result Value Ref Range    WBC 8.73 3.90 - 12.70 K/uL    RBC 2.87 (L) 4.00 - 5.40 M/uL    Hemoglobin 8.6 (L) 12.0 - 16.0 g/dL    Hematocrit 27.9 (L) 37.0 - 48.5 %    MCV 97 82 - 98 fL    MCH 30.0 27.0 - 31.0 pg    MCHC  30.8 (L) 32.0 - 36.0 g/dL    RDW 14.7 (H) 11.5 - 14.5 %    Platelets 318 150 - 350 K/uL    MPV 10.0 9.2 - 12.9 fL    Immature Granulocytes 0.6 (H) 0.0 - 0.5 %    Gran # (ANC) 5.6 1.8 - 7.7 K/uL    Immature Grans (Abs) 0.05 (H) 0.00 - 0.04 K/uL    Lymph # 2.2 1.0 - 4.8 K/uL    Mono # 0.8 0.3 - 1.0 K/uL    Eos # 0.2 0.0 - 0.5 K/uL    Baso # 0.01 0.00 - 0.20 K/uL    nRBC 0 0 /100 WBC    Gran% 63.6 38.0 - 73.0 %    Lymph% 25.3 18.0 - 48.0 %    Mono% 8.7 4.0 - 15.0 %    Eosinophil% 1.7 0.0 - 8.0 %    Basophil% 0.1 0.0 - 1.9 %    Differential Method Automated        No results for input(s): POCTGLUCOSE in the last 168 hours.    No results found for: HGBA1C     Active Hospital Problems    Diagnosis  POA    *Pericardial tamponade [I31.4]  Yes    Seizure disorder [G40.909]  Unknown    Anemia [D64.9]  Unknown    Pericardial effusion [I31.3]  Yes    ELVER positive [R76.8]  No      Resolved Hospital Problems   No resolved problems to display.         enoxaparin  40 mg Subcutaneous Daily    eslicarbazepine  1,600 mg Oral QHS    predniSONE  60 mg Oral Daily     acetaminophen, morphine, ondansetron, ondansetron, sodium chloride 0.9%    MRI brain  Impression       No acute intracranial abnormality identified on today's examination.  Specifically, no evidence of acute infarction or enhancing lesion.         Assessment and Plan for problems addressed today:   1. Pericardial tamponade  - noted tamponade s/p pericardial drain  - high consideration for connective tissue disease/autoimmune disease as cause   - overall, labs suspicious for SLE  - hold off antibiotics given no growth per OSH  - rheumatology consulted, appreciate recommendations and assistance. On IV steroids per their recommendation, transitioned to PO prednisone 60mg  - cards following  - pericardial drain removed  - cards recommending limited STEFFI in 2-3 days    2. Seizure disorder  - known previous history, states taking medication since her childhood  - previously  on aptiom and dilantin  - dilantin discontinued given risk for SLE, started on keppra but discontinued  - currently on home aptiom per neuro recs  - some confusion noted in ICU, MRI brain performed  - Neurology consulted, appreciate recs  - MRI brain nonacute     3. ELVER positive  - + ELVER in setting of pericarditis and pleural effusion  - Highly suspicious for SLE  - Appreciate rheumatology recs  - left knee aspirated per rheum, c/w inflammatory arthritis     4. Anemia  - noted anemia with MCV of 97  - hemodynamically stable  - no evidence of acute blood loss  - transfuse < 7 as needed   - daily CBCs    5. Hypomagnesemia  - replace  - BMP in a.m.    6. Hypokalemia  - replace  - BMP in a.m.    7. Acute encephalopathy, resolved  - MRI brain nonacute    8. Proteinuria  - possible renal involvement given blood/protein on outside UA  - repeat UA- no protein   - obtain 24hr urine protein   - may end up needing renal biopsy        Diet: regular  DVT PPx: lovenox  Code status: FULL    Discharge plan and follow up:  Follow up Rheumatology recs, repeat STEFFI in next 2-3 days, appreciate neuro input in seizure medication management      Hui Ferguson MD  Hospital Medicine Staff  [phone] pager

## 2019-03-09 NOTE — ASSESSMENT & PLAN NOTE
- noted tamponade s/p pericardial drain  - high consideration for connective tissue disease/autoimmune disease as cause   - overall, labs suspicious for SLE  - hold off antibiotics given no growth per OSH  - drain removed  - rheumatology consulted, appreciate recommendations and assistance. On IV steroids per their recommendation

## 2019-03-09 NOTE — PROGRESS NOTES
[facility]  Critical Care Medicine  Progress Note    Patient Name: Pinky Lanier  MRN: [id number]  Admission Date: 3/7/2019  Hospital Length of Stay: 1 days  Code Status: Full Code  Attending Provider: Daniele Jaramillo, *  Primary Care Provider: Primary Doctor No   Principal Problem: Pericardial tamponade    Subjective:     HPI:  Pinky Lanier is a 29 y.o. lady with a previous seizure disorder who presents as a transfer from [facility] in [place] for further evaluation for pericardial effusion.  Per chart review, patient was recently admitted to on 2/25/2019 for acute chest pain.  SHe was diagnosed with acute, likely viral pericarditis and was noted to have moderate pericardial effusion without tamponade physiology.  She was started on colchicine 0.6 mg PO daily and ibuprofen.  She was subsequently discharged on 2/27 in good condition, however she had noticed worsening severe chest pain that was associated with light headedness, dizziness, and dyspnea.  She then presented to [facility] where she was noted to have hypotension with a BP of 90/60.  There, a CT chest revealed a large pericardial effusion as well as bilateral pleural effusions, R > L.  She was started on a levophed gtt for BP management.  Subsequent 2D echo revealed a large effusion with a maximum thickness of 3.2 cm without evidence of collapse of the R ventricle.  She underwent subsequent pericardial drain placement with initial drain output of 500 ccs per shift.  This eventually improved to approximately 50 ccs within the shift prior to transfer.  Fluid analysis noted 13k WBCs, low pH, and glucose in the 60s; gram stain was negative.  Other labs at the time revealed hypoalbuminemia and hypocomplementemia, where there was concern that the underlying effusions were secondary to lupus.  She was started on methylpred 125 mg IV daily.  CBCs at the time mostly revealed an anemia, but otherwise no  leukopenia/leukocytosis or thrombocytopenia.  CMP was also unremarkable with normal kidney function.  She was noted there to have an elevated urine protein.  Additionally, neurology was consulted, who performed an EEG without any epileptiform activity noted.  She was started on keppra, continued on eslicarbazepine, and discontinued phenytoin.  ELVER and anti-histone reported returned with elevated ELVER, but none were reported.     She was transferred to [facility] on 3/7/2019.  She notes that she has never had chest pain like this previously.  Denies any personal history of skin rashes, light sensitivity rashes, malar rashes, oral ulcers, raynaud's phenomenon, or arthritis.  Denies any family history of lupus or any other rheumatologic disease.  Denies any history of miscarriages or DVTs.  Currently living with her boyfriend.        Interval History/Significant Events: Mentation improved overnight. Chest tube had minimal drainage, removed by Cardiology team today. No new issues    Review of Systems   Constitutional: Positive for fatigue. Negative for fever.   Respiratory: Negative for cough, shortness of breath and wheezing.    Cardiovascular: Negative for chest pain, palpitations and leg swelling.   Gastrointestinal: Negative for abdominal distention, abdominal pain, constipation, diarrhea, nausea and vomiting.   Endocrine: Negative for polydipsia and polyphagia.   Genitourinary: Negative for dysuria and frequency.   Musculoskeletal: Negative for arthralgias and myalgias.   Neurological: Negative for weakness, light-headedness and headaches.   Hematological: Negative for adenopathy.   Psychiatric/Behavioral: Negative for behavioral problems and confusion.     Objective:     Vital Signs (Most Recent):  Temp: (P) 98 °F (36.7 °C) (03/08/19 1958)  Pulse: (P) 83 (03/08/19 1958)  Resp: (P) 20 (03/08/19 1958)  BP: (P) 105/67 (03/08/19 1958)  SpO2: (P) 100 % (03/08/19 1958) Vital Signs (24h Range):  Temp:  [97.2 °F (36.2 °C)-98.6 °F  (37 °C)] (P) 98 °F (36.7 °C)  Pulse:  [] (P) 83  Resp:  [13-36] (P) 20  SpO2:  [89 %-100 %] (P) 100 %  BP: ()/(52-72) (P) 105/67   Weight: 55.8 kg (123 lb)  Body mass index is 20.47 kg/m².      Intake/Output Summary (Last 24 hours) at 3/8/2019 2036  Last data filed at 3/8/2019 1100  Gross per 24 hour   Intake 1720 ml   Output 100 ml   Net 1620 ml       Physical Exam   Constitutional: She is oriented to person, place, and time. She appears well-developed and well-nourished. No distress.   HENT:   Head: Normocephalic and atraumatic.   Mouth/Throat: No oropharyngeal exudate.   Eyes: Pupils are equal, round, and reactive to light.   Neck: Normal range of motion.   R IJ in place and dressed   Cardiovascular: Normal rate, regular rhythm, normal heart sounds and intact distal pulses.   No murmur heard.  percardial drain in place with scant serosanguinous drainage    Pulmonary/Chest: Effort normal and breath sounds normal. No stridor. No respiratory distress.   Abdominal: Soft. Bowel sounds are normal. She exhibits no distension. There is no tenderness.   Musculoskeletal: Normal range of motion. She exhibits no edema.   Neurological: She is alert and oriented to person, place, and time. No cranial nerve deficit.   Skin: Skin is warm and dry. She is not diaphoretic.   Psychiatric: She has a normal mood and affect. Her behavior is normal.   Nursing note and vitals reviewed.      Vents:     Lines/Drains/Airways     Central Venous Catheter Line                 Percutaneous Central Line Insertion/Assessment - triple lumen  03/07/19 0600 right internal jugular 1 day          Peripheral Intravenous Line                 Peripheral IV - Single Lumen 03/07/19 0600 Anterior;Left Hand 1 day         Peripheral IV - Single Lumen 03/07/19 0600 Anterior;Right Wrist 1 day              Significant Labs:    CBC/Anemia Profile:  Recent Labs   Lab 03/07/19  0607 03/08/19  0409   WBC 7.29 5.31   HGB 9.6* 10.2*   HCT 30.8* 32.6*   PLT  347 369*   MCV 97 96   RDW 14.5 14.6*   IRON 59  --    FERRITIN 201  --         Chemistries:  Recent Labs   Lab 03/07/19  0607 03/08/19  0409    135*   K 3.4* 4.5    103   CO2 29 28   BUN 7 7   CREATININE 0.5 0.5   CALCIUM 7.7* 8.0*   ALBUMIN 1.8* 1.7*   PROT 6.7 6.8   BILITOT 0.2 0.1   ALKPHOS 94 92   ALT 60* 59*   * 115*   MG 1.1* 1.7   PHOS 2.6* 4.1             ABG  No results for input(s): PH, PO2, PCO2, HCO3, BE in the last 168 hours.  Assessment/Plan:     Neuro   Seizure disorder    - known previous history, states taking medication since her childhood  - previously on aptiom and dilantin  - dilantin discontinued given risk for SLE, started on keppra  - Neurology consulted, appreciate recs     Cardiac/Vascular   * Pericardial tamponade    - noted tamponade s/p pericardial drain  - high consideration for connective tissue disease/autoimmune disease as cause   - overall, labs suspicious for SLE  - hold off antibiotics given no growth per OSH  - drain removed  - rheumatology consulted, appreciate recommendations and assistance. On IV steroids per their recommendation     Immunology/Multi System   ELVER positive    + ELVER in setting of pericarditis and pleural effusion  Suspicious for SLE  Appreciate rheumatology recs     Oncology   Anemia    - noted anemia with MCV of 97  - hemodynamically stable  - no evidence of acute blood loss  - transfuse < 7 as needed   - daily CBCs        Critical Care Daily Checklist:    A: Awake: RASS Goal/Actual Goal:    Actual: Arreguin Agitation Sedation Scale (RASS): Alert and calm   B: Spontaneous Breathing Trial Performed?     C: SAT & SBT Coordinated?  N/A                 D: Delirium: CAM-ICU Overall CAM-ICU: Negative   E: Early Mobility Performed? Yes   F: Feeding Goal:    Status:     Current Diet Order   Procedures    Diet Adult Regular (IDDSI Level 7)      AS: Analgesia/Sedation N/A   T: Thromboembolic Prophylaxis Lovenox   H: HOB > 300 Yes   U: Stress Ulcer  Prophylaxis (if needed) N/A   G: Glucose Control LDSSI   B: Bowel Function Stool Occurrence: 0   I: Indwelling Catheter (Lines & [place]) Necessity Central line   D: De-escalation of Antimicrobials/Pharmacotherapies N/A    Plan for the day/ETD Step down    Code Status:  Family/Goals of Care: Full Code          Critical secondary to Patient has a condition that poses threat to life and bodily function: Pericardial effusion requiring drainage      Critical care was time spent personally by me on the following activities: development of treatment plan with patient or surrogate and bedside caregivers, discussions with consultants, evaluation of patient's response to treatment, examination of patient, ordering and performing treatments and interventions, ordering and review of laboratory studies, ordering and review of radiographic studies, pulse oximetry, re-evaluation of patient's condition. This critical care time did not overlap with that of any other provider or involve time for any procedures.     Jesús Cleaning MD  Critical Care Medicine  [facility]

## 2019-03-09 NOTE — ASSESSMENT & PLAN NOTE
- noted anemia with MCV of 97  - hemodynamically stable  - no evidence of acute blood loss  - transfuse < 7 as needed   - daily CBCs

## 2019-03-09 NOTE — ASSESSMENT & PLAN NOTE
Pinky Lanier is a 29 year old female with past medical history of seizure disorder (since the age of 1 mos) who presents as a transfer from [facility] in [place] for further evaluation for pericardial effusion and pleural effusions.     Patient was recently admitted to [facility] on 2/25/2019 for acute chest pain.She was diagnosed with acute, likely viral pericarditis and was noted to have moderate pericardial effusion without tamponade physiology.  She was started on colchicine 0.6 mg PO BID and ibuprofen 600mg tid .  She was subsequently discharged on 2/27 in good condition, however she had noticed worsening severe chest pain that was associated with light headedness, dizziness, and dyspnea.    She then presented to [facility] on 3/3 where she was noted to have hypotension with a BP of 90/60.  There, a CT chest revealed a large pericardial effusion as well as bilateral pleural effusions, R > L.  She was started on a levophed gtt for BP management.  Subsequent 2D echo revealed a large effusion with a maximum thickness of 3.2 cm without evidence of collapse of the R ventricle.  She underwent subsequent pericardial drain placement 3/4/19 with initial drain output of 500 ccs per shift.  This eventually improved to approximately 50 ccs within the shift prior to transfer.  Fluid analysis noted 13k WBCs, low pH, and glucose in the 60s; gram stain was negative.  Other labs at the time revealed hypoalbuminemia and hypocomplementemia, and positve ELVER. where there was concern that the underlying effusions were secondary to lupus.  She was initially started on prednisone 30mg and then increased to methylpred 125 mg IV daily.   Neurology was also who performed an EEG which showed cortical irratlibility from the left frontal central parietal region as well as mild encephalopathy, no seizure activity.  She was started on keppra, continued on eslicarbazepine, and discontinued on phenytoin.      She was  transferred to [facility] on 3/7/2019.  Here she was intimally somnolent but then later awake and alert. She denies any chest pain, shortness of breath, rashes, joint pain or swelling, no raynads, oral or nasal ulcers.   No family history of SLE other autoimmune conditions       Labs showing wct of 7.29 with normal differential, hgb 9.6, plt 347, Cr 0.5, ALT 60, , Alk phos 94, T bili 0.2, albumin 1.8, sed rate 56, CRP 43.5. +ELVER 1:2560 C3 70 and C4 15, RAF negative.   Blood cx NGTD, TSH 1.9, FT4 0.49, lupus anticoagulant negative, cardiolipin IgM 20.9, beta 2 glycoprotein IgA 23, left knee aspirate showing wbc 2,100 (83% lymphocytes), no crystals culture NGTD, UA negative UPC 0.37   Imaging showing CXR showing b/l pleural effusions and cardiomegaly. CT head with no acute abnormalities. MRI brain normal.       Problem list:  Pericarditis with pericardial effusion s/p pericardial drain placement (drain removed 3/8)  Pleural effusions b/l  transaminitis   Raynauds   +ELVER 2560 (history of low complements although here normal)   +sm/RNP  Elevated CPK , could be lupus myositis vs from seizures     Patient meets SLICC criteria for SLE (arthritis, Serositis, +ELVER, +SM/RNP, low complements).     Treatments thus far:  Solumedrol 125mg IV 3/4- 3/8  Prednisone 60mg 3/9    PLAN:  -continue prednisone 60mg daily today   -appreciate neurology recs   -elevated LFTs now downtrending, CPK also elevated to 909 decreased to 758, please order repeat CPK for AM   -for follow up of pericardial effusion and pleural effusion, please order repeat CXR  -patient with positive beta 2 glycoprotein and cardiolipin, will need repeat in 12 weeks   -labs pending: dsDNA pending  anti-histone   -left knee aspirated 3/8: fluid consistent with inflammatory arthritis; please order arthritis survey xrays   -she has low albumin 1.7, please consult nutrition

## 2019-03-09 NOTE — PROGRESS NOTES
[facility]  Rheumatology  Progress Note    Patient Name: Pinky Lanier  MRN: [id number]  Admission Date: 3/7/2019  Hospital Length of Stay: 2 days  Code Status: Full Code   Attending Provider: Hui Ferguson MD  Primary Care Physician: Primary Doctor No  Principal Problem: Pericardial tamponade    Subjective:     HPI: Pinky Lanier is a 29 year old female with past medical history of seizure disorder (since the age of 1 mos) who presents as a transfer from [facility] in [place] for further evaluation for pericardial effusion and pleural effusions.     Patient was recently admitted to [facility] on 2/25/2019 for acute chest pain.She was diagnosed with acute, likely viral pericarditis and was noted to have moderate pericardial effusion without tamponade physiology.  She was started on colchicine 0.6 mg PO BID and ibuprofen 600mg tid .  She was subsequently discharged on 2/27 in good condition, however she had noticed worsening severe chest pain that was associated with light headedness, dizziness, and dyspnea.    She then presented to [facility] on 3/3 where she was noted to have hypotension with a BP of 90/60.  There, a CT chest revealed a large pericardial effusion as well as bilateral pleural effusions, R > L.  She was started on a levophed gtt for BP management.  Subsequent 2D echo revealed a large effusion with a maximum thickness of 3.2 cm without evidence of collapse of the R ventricle.  She underwent subsequent pericardial drain placement 3/4/19 with initial drain output of 500 ccs per shift.  This eventually improved to approximately 50 ccs within the shift prior to transfer.  Fluid analysis noted 13k WBCs, low pH, and glucose in the 60s; gram stain was negative.  Other labs at the time revealed hypoalbuminemia and hypocomplementemia, and positve ELVER. where there was concern that the underlying effusions were secondary to lupus.  She was initially started on prednisone  30mg and then increased to methylpred 125 mg IV daily.   Neurology was also who performed an EEG which showed cortical irratlibility from the left frontal central parietal region as well as mild encephalopathy, no seizure activity.  She was started on keppra, continued on eslicarbazepine, and discontinued on phenytoin.      She was transferred to [facility] on 3/7/2019.  Here she was intimally somnolent but then later awake and alert. She denies any chest pain, shortness of breath, rashes, joint pain or swelling, no raynads, oral or nasal ulcers.   No family history of SLE other autoimmune conditions           Interval History: no acute overnight events. Patient had drain removed yesterday. Is walking to bathroom. She has repeat echo pending     Current Facility-Administered Medications   Medication Frequency    acetaminophen tablet 650 mg Q4H PRN    enoxaparin injection 40 mg Daily    eslicarbazepine Tab 1,600 mg QHS    morphine injection 2 mg On Call Procedure    ondansetron disintegrating tablet 8 mg Q8H PRN    ondansetron injection 4 mg Q8H PRN    predniSONE tablet 60 mg Daily    sodium chloride 0.9% flush 3 mL PRN     Objective:     Vital Signs (Most Recent):  Temp: 98 °F (36.7 °C) (03/09/19 1243)  Pulse: 88 (03/09/19 1243)  Resp: 18 (03/09/19 1243)  BP: 115/71 (03/09/19 1243)  SpO2: 99 % (03/09/19 1243)  O2 Device (Oxygen Therapy): room air (03/09/19 1000) Vital Signs (24h Range):  Temp:  [97.5 °F (36.4 °C)-98.6 °F (37 °C)] 98 °F (36.7 °C)  Pulse:  [] 88  Resp:  [14-28] 18  SpO2:  [93 %-100 %] 99 %  BP: (101-115)/(55-71) 115/71     Weight: 55.3 kg (121 lb 14.6 oz) (03/09/19 0800)  Body mass index is 20.29 kg/m².  Body surface area is 1.59 meters squared.      Intake/Output Summary (Last 24 hours) at 3/9/2019 1246  Last data filed at 3/9/2019 0600  Gross per 24 hour   Intake 480 ml   Output 25 ml   Net 455 ml       Physical Exam   Nursing note reviewed.  Constitutional: She is oriented to person, place,  and time and well-developed, well-nourished, and in no distress. No distress.   HENT:   Head: Normocephalic and atraumatic.   Mouth/Throat: Oropharynx is clear and moist. No oropharyngeal exudate.   Eyes: Conjunctivae and EOM are normal. Pupils are equal, round, and reactive to light. No scleral icterus.   Neck: Normal range of motion. Neck supple.   Cardiovascular: Normal rate, regular rhythm and normal heart sounds.    Pulmonary/Chest: Effort normal. She has no wheezes.   Decreased breath sounds in bases    Abdominal: Soft. Bowel sounds are normal. She exhibits no distension.   Lymphadenopathy:     She has no cervical adenopathy.   Neurological: She is alert and oriented to person, place, and time.   Skin: Skin is warm and dry. She is not diaphoretic.     Hyperpigmented areas on LE b/l    Musculoskeletal: Normal range of motion. She exhibits no edema.   Mild left and right knee effusion, knee is not warm or erythematous          Significant Labs:  BMP:   Recent Labs   Lab 03/09/19  0528   GLU 91      K 3.3*      CO2 31*   BUN 9   CREATININE 0.5   CALCIUM 7.9*   MG 1.0*     CBC:   Recent Labs   Lab 03/09/19  0528   WBC 8.73   HGB 8.6*   HCT 27.9*        CMP:   Recent Labs   Lab 03/09/19 0528   GLU 91   CALCIUM 7.9*   ALBUMIN 1.7*   PROT 6.0      K 3.3*   CO2 31*      BUN 9   CREATININE 0.5   ALKPHOS 96   ALT 47*   AST 91*   BILITOT 0.1       Significant Imaging:  Imaging results within the past 24 hours have been reviewed.    Assessment/Plan:     ELVER positive    Pinky Lanier is a 29 year old female with past medical history of seizure disorder (since the age of 1 mos) who presents as a transfer from [facility] in [place] for further evaluation for pericardial effusion and pleural effusions.     Patient was recently admitted to [facility] on 2/25/2019 for acute chest pain.She was diagnosed with acute, likely viral pericarditis and was noted to have moderate  pericardial effusion without tamponade physiology.  She was started on colchicine 0.6 mg PO BID and ibuprofen 600mg tid .  She was subsequently discharged on 2/27 in good condition, however she had noticed worsening severe chest pain that was associated with light headedness, dizziness, and dyspnea.    She then presented to [facility] on 3/3 where she was noted to have hypotension with a BP of 90/60.  There, a CT chest revealed a large pericardial effusion as well as bilateral pleural effusions, R > L.  She was started on a levophed gtt for BP management.  Subsequent 2D echo revealed a large effusion with a maximum thickness of 3.2 cm without evidence of collapse of the R ventricle.  She underwent subsequent pericardial drain placement 3/4/19 with initial drain output of 500 ccs per shift.  This eventually improved to approximately 50 ccs within the shift prior to transfer.  Fluid analysis noted 13k WBCs, low pH, and glucose in the 60s; gram stain was negative.  Other labs at the time revealed hypoalbuminemia and hypocomplementemia, and positve ELVER. where there was concern that the underlying effusions were secondary to lupus.  She was initially started on prednisone 30mg and then increased to methylpred 125 mg IV daily.   Neurology was also who performed an EEG which showed cortical irratlibility from the left frontal central parietal region as well as mild encephalopathy, no seizure activity.  She was started on keppra, continued on eslicarbazepine, and discontinued on phenytoin.      She was transferred to [facility] on 3/7/2019.  Here she was intimally somnolent but then later awake and alert. She denies any chest pain, shortness of breath, rashes, joint pain or swelling, no raynads, oral or nasal ulcers.   No family history of SLE other autoimmune conditions       Labs showing wct of 7.29 with normal differential, hgb 9.6, plt 347, Cr 0.5, ALT 60, , Alk phos 94, T bili 0.2, albumin 1.8, sed rate 56, CRP  43.5. +ELVER 1:2560 C3 70 and C4 15, RAF negative.   Blood cx NGTD, TSH 1.9, FT4 0.49, lupus anticoagulant negative, cardiolipin IgM 20.9, beta 2 glycoprotein IgA 23, left knee aspirate showing wbc 2,100 (83% lymphocytes), no crystals culture NGTD, UA negative UPC 0.37   Imaging showing CXR showing b/l pleural effusions and cardiomegaly. CT head with no acute abnormalities. MRI brain normal.       Problem list:  Pericarditis with pericardial effusion s/p pericardial drain placement (drain removed 3/8)  Pleural effusions b/l  transaminitis   Raynauds   +ELVER 2560 (history of low complements although here normal)   +sm/RNP  Elevated CPK , could be lupus myositis vs from seizures     Patient meets SLICC criteria for SLE (arthritis, Serositis, +ELVER, +SM/RNP, low complements).     Treatments thus far:  Solumedrol 125mg IV 3/4- 3/8  Prednisone 60mg 3/9    PLAN:  -continue prednisone 60mg daily today   -appreciate neurology recs   -elevated LFTs now downtrending, CPK also elevated to 909 decreased to 758, please order repeat CPK for AM   -for follow up of pericardial effusion and pleural effusion, please order repeat CXR (PA and lateral)   -patient with positive beta 2 glycoprotein and cardiolipin, will need repeat in 12 weeks   -labs pending: dsDNA pending  anti-histone   -left knee aspirated 3/8: fluid consistent with inflammatory arthritis; please order arthritis survey xrays   -she has low albumin 1.7, please consult nutrition                 Barbara Carpenter MD  Rheumatology  [facility]    RHEUMATOLOGY ATTENDING:  Patient presented, personally interviewed and examined, medical records reviewed.  29 yr old lady w seizure disorder since infancy transferred from [facility] for pericardial & pleural effusions requiring drain. Had some joint pain with swelling in hands and knees and discoloration of hands c/w Raynaud's. Also some hyperpigmentation of hands reported. Started on steroids and w/u revealed +ELVER  1:2560 sp with +Sm & RNP;  ds DNA still pending. Anti histone 5.4 (0.9); C3 & C4 ok at 70 & 15 but apparently were low at [place] hospital. Has mildly elevated IgM  aCLA but LAC neg; RAF lesvia' CPK elevated x 2 at 909 & 758 (but apparently had seizure in [place]. ESR 56; CBC w mild anemia; & mild thrombocytosis; CMP with alb. 1.7; ; ALT 59; Na 135;   Results of aspiration of L knee c/w SLE arthritis w WBC of 2100 mostly lymphs.   Currently on prednisone 60 mg po wo adverse effects.  Labs: low K+; low Mg; Lower hg;   Today in good spirits and no complaints.   Rx: stay the course & as per Dr. Carpenter.  Findings discussed with patient, her family and Dr. Carpenter whose note and assessment I agree gloria

## 2019-03-09 NOTE — ASSESSMENT & PLAN NOTE
- known previous history, states taking medication since her childhood  - previously on aptiom and dilantin  - dilantin discontinued given risk for SLE, started on keppra  - Neurology consulted, appreciate recs

## 2019-03-09 NOTE — ASSESSMENT & PLAN NOTE
+ ELVER in setting of pericarditis and pleural effusion  Suspicious for SLE  Appreciate rheumatology recs

## 2019-03-09 NOTE — SUBJECTIVE & OBJECTIVE
Interval History/Significant Events: Mentation improved overnight. Chest tube had minimal drainage, removed by Cardiology team today. No new issues    Review of Systems   Constitutional: Positive for fatigue. Negative for fever.   Respiratory: Negative for cough, shortness of breath and wheezing.    Cardiovascular: Negative for chest pain, palpitations and leg swelling.   Gastrointestinal: Negative for abdominal distention, abdominal pain, constipation, diarrhea, nausea and vomiting.   Endocrine: Negative for polydipsia and polyphagia.   Genitourinary: Negative for dysuria and frequency.   Musculoskeletal: Negative for arthralgias and myalgias.   Neurological: Negative for weakness, light-headedness and headaches.   Hematological: Negative for adenopathy.   Psychiatric/Behavioral: Negative for behavioral problems and confusion.     Objective:     Vital Signs (Most Recent):  Temp: (P) 98 °F (36.7 °C) (03/08/19 1958)  Pulse: (P) 83 (03/08/19 1958)  Resp: (P) 20 (03/08/19 1958)  BP: (P) 105/67 (03/08/19 1958)  SpO2: (P) 100 % (03/08/19 1958) Vital Signs (24h Range):  Temp:  [97.2 °F (36.2 °C)-98.6 °F (37 °C)] (P) 98 °F (36.7 °C)  Pulse:  [] (P) 83  Resp:  [13-36] (P) 20  SpO2:  [89 %-100 %] (P) 100 %  BP: ()/(52-72) (P) 105/67   Weight: 55.8 kg (123 lb)  Body mass index is 20.47 kg/m².      Intake/Output Summary (Last 24 hours) at 3/8/2019 2036  Last data filed at 3/8/2019 1100  Gross per 24 hour   Intake 1720 ml   Output 100 ml   Net 1620 ml       Physical Exam   Constitutional: She is oriented to person, place, and time. She appears well-developed and well-nourished. No distress.   HENT:   Head: Normocephalic and atraumatic.   Mouth/Throat: No oropharyngeal exudate.   Eyes: Pupils are equal, round, and reactive to light.   Neck: Normal range of motion.   R IJ in place and dressed   Cardiovascular: Normal rate, regular rhythm, normal heart sounds and intact distal pulses.   No murmur heard.  percardial  drain in place with scant serosanguinous drainage    Pulmonary/Chest: Effort normal and breath sounds normal. No stridor. No respiratory distress.   Abdominal: Soft. Bowel sounds are normal. She exhibits no distension. There is no tenderness.   Musculoskeletal: Normal range of motion. She exhibits no edema.   Neurological: She is alert and oriented to person, place, and time. No cranial nerve deficit.   Skin: Skin is warm and dry. She is not diaphoretic.   Psychiatric: She has a normal mood and affect. Her behavior is normal.   Nursing note and vitals reviewed.      Vents:     Lines/Drains/Airways     Central Venous Catheter Line                 Percutaneous Central Line Insertion/Assessment - triple lumen  03/07/19 0600 right internal jugular 1 day          Peripheral Intravenous Line                 Peripheral IV - Single Lumen 03/07/19 0600 Anterior;Left Hand 1 day         Peripheral IV - Single Lumen 03/07/19 0600 Anterior;Right Wrist 1 day              Significant Labs:    CBC/Anemia Profile:  Recent Labs   Lab 03/07/19  0607 03/08/19  0409   WBC 7.29 5.31   HGB 9.6* 10.2*   HCT 30.8* 32.6*    369*   MCV 97 96   RDW 14.5 14.6*   IRON 59  --    FERRITIN 201  --         Chemistries:  Recent Labs   Lab 03/07/19  0607 03/08/19  0409    135*   K 3.4* 4.5    103   CO2 29 28   BUN 7 7   CREATININE 0.5 0.5   CALCIUM 7.7* 8.0*   ALBUMIN 1.8* 1.7*   PROT 6.7 6.8   BILITOT 0.2 0.1   ALKPHOS 94 92   ALT 60* 59*   * 115*   MG 1.1* 1.7   PHOS 2.6* 4.1

## 2019-03-09 NOTE — PLAN OF CARE
Problem: Adult Inpatient Plan of Care  Goal: Plan of Care Review  Outcome: Revised  Bed rails padded, all 4 railings in up position per Pts request, waffle mattress applied to bed. Pt rested comfortably throughout night, mother at bedside. Pt remains free from injury/falls for shift. Educated Pt on meds no questions at this time. VSS.  No complaints of CP, SOB, pain/discomfort  Bed locked in lowest position, call bell within reach. All questions addressed.  Will continue to monitor.

## 2019-03-09 NOTE — SUBJECTIVE & OBJECTIVE
Interval History: no acute overnight events. Patient had drain removed yesterday. Is walking to bathroom. She has repeat echo pending     Current Facility-Administered Medications   Medication Frequency    acetaminophen tablet 650 mg Q4H PRN    enoxaparin injection 40 mg Daily    eslicarbazepine Tab 1,600 mg QHS    morphine injection 2 mg On Call Procedure    ondansetron disintegrating tablet 8 mg Q8H PRN    ondansetron injection 4 mg Q8H PRN    predniSONE tablet 60 mg Daily    sodium chloride 0.9% flush 3 mL PRN     Objective:     Vital Signs (Most Recent):  Temp: 98 °F (36.7 °C) (03/09/19 1243)  Pulse: 88 (03/09/19 1243)  Resp: 18 (03/09/19 1243)  BP: 115/71 (03/09/19 1243)  SpO2: 99 % (03/09/19 1243)  O2 Device (Oxygen Therapy): room air (03/09/19 1000) Vital Signs (24h Range):  Temp:  [97.5 °F (36.4 °C)-98.6 °F (37 °C)] 98 °F (36.7 °C)  Pulse:  [] 88  Resp:  [14-28] 18  SpO2:  [93 %-100 %] 99 %  BP: (101-115)/(55-71) 115/71     Weight: 55.3 kg (121 lb 14.6 oz) (03/09/19 0800)  Body mass index is 20.29 kg/m².  Body surface area is 1.59 meters squared.      Intake/Output Summary (Last 24 hours) at 3/9/2019 1246  Last data filed at 3/9/2019 0600  Gross per 24 hour   Intake 480 ml   Output 25 ml   Net 455 ml       Physical Exam   Nursing note reviewed.  Constitutional: She is oriented to person, place, and time and well-developed, well-nourished, and in no distress. No distress.   HENT:   Head: Normocephalic and atraumatic.   Mouth/Throat: Oropharynx is clear and moist. No oropharyngeal exudate.   Eyes: Conjunctivae and EOM are normal. Pupils are equal, round, and reactive to light. No scleral icterus.   Neck: Normal range of motion. Neck supple.   Cardiovascular: Normal rate, regular rhythm and normal heart sounds.    Pulmonary/Chest: Effort normal. She has no wheezes.   Decreased breath sounds in bases    Abdominal: Soft. Bowel sounds are normal. She exhibits no distension.   Lymphadenopathy:     She  has no cervical adenopathy.   Neurological: She is alert and oriented to person, place, and time.   Skin: Skin is warm and dry. She is not diaphoretic.     Hyperpigmented areas on LE b/l    Musculoskeletal: Normal range of motion. She exhibits no edema.   Mild left and right knee effusion, knee is not warm or erythematous          Significant Labs:  BMP:   Recent Labs   Lab 03/09/19 0528   GLU 91      K 3.3*      CO2 31*   BUN 9   CREATININE 0.5   CALCIUM 7.9*   MG 1.0*     CBC:   Recent Labs   Lab 03/09/19 0528   WBC 8.73   HGB 8.6*   HCT 27.9*        CMP:   Recent Labs   Lab 03/09/19 0528   GLU 91   CALCIUM 7.9*   ALBUMIN 1.7*   PROT 6.0      K 3.3*   CO2 31*      BUN 9   CREATININE 0.5   ALKPHOS 96   ALT 47*   AST 91*   BILITOT 0.1       Significant Imaging:  Imaging results within the past 24 hours have been reviewed.

## 2019-03-10 LAB
ALBUMIN SERPL BCP-MCNC: 1.8 G/DL
ALP SERPL-CCNC: 82 U/L
ALT SERPL W/O P-5'-P-CCNC: 49 U/L
ANION GAP SERPL CALC-SCNC: 4 MMOL/L
AST SERPL-CCNC: 98 U/L
BASOPHILS # BLD AUTO: 0.01 K/UL
BASOPHILS NFR BLD: 0.1 %
BILIRUB SERPL-MCNC: 0.1 MG/DL
BILIRUB UR QL STRIP: NEGATIVE
BODY FLD TYPE: NORMAL
BSA FOR ECHO PROCEDURE: 1.59 M2
BUN SERPL-MCNC: 10 MG/DL
CALCIUM SERPL-MCNC: 8.2 MG/DL
CHLORIDE SERPL-SCNC: 103 MMOL/L
CK SERPL-CCNC: 728 U/L
CLARITY UR REFRACT.AUTO: CLEAR
CO2 SERPL-SCNC: 30 MMOL/L
COLOR UR AUTO: YELLOW
CREAT SERPL-MCNC: 0.5 MG/DL
CRYSTALS FLD MICRO: NEGATIVE
CV ECHO LV RWT: 0.27 CM
DIFFERENTIAL METHOD: ABNORMAL
E/E' RATIO: 9.5
ECHO LV POSTERIOR WALL: 0.6 CM (ref 0.6–1.1)
EOSINOPHIL # BLD AUTO: 0.1 K/UL
EOSINOPHIL NFR BLD: 1.4 %
ERYTHROCYTE [DISTWIDTH] IN BLOOD BY AUTOMATED COUNT: 14.8 %
EST. GFR  (AFRICAN AMERICAN): >60 ML/MIN/1.73 M^2
EST. GFR  (NON AFRICAN AMERICAN): >60 ML/MIN/1.73 M^2
FRACTIONAL SHORTENING: 43 % (ref 28–44)
GLUCOSE SERPL-MCNC: 91 MG/DL
GLUCOSE UR QL STRIP: NEGATIVE
HCT VFR BLD AUTO: 26.6 %
HGB BLD-MCNC: 8.4 G/DL
HGB UR QL STRIP: NEGATIVE
IMM GRANULOCYTES # BLD AUTO: 0.06 K/UL
IMM GRANULOCYTES NFR BLD AUTO: 0.7 %
INTERVENTRICULAR SEPTUM: 0.61 CM (ref 0.6–1.1)
KETONES UR QL STRIP: NEGATIVE
LA MAJOR: 5.2 CM
LA MINOR: 5.2 CM
LA WIDTH: 3.2 CM
LEFT ATRIUM SIZE: 2.53 CM
LEFT ATRIUM VOLUME INDEX: 22.4 ML/M2
LEFT ATRIUM VOLUME: 35.78 CM3
LEFT INTERNAL DIMENSION IN SYSTOLE: 2.55 CM (ref 2.1–4)
LEFT VENTRICLE DIASTOLIC VOLUME INDEX: 57.04 ML/M2
LEFT VENTRICLE DIASTOLIC VOLUME: 91.14 ML
LEFT VENTRICLE MASS INDEX: 49.3 G/M2
LEFT VENTRICLE SYSTOLIC VOLUME INDEX: 14.6 ML/M2
LEFT VENTRICLE SYSTOLIC VOLUME: 23.4 ML
LEFT VENTRICULAR INTERNAL DIMENSION IN DIASTOLE: 4.47 CM (ref 3.5–6)
LEFT VENTRICULAR MASS: 78.75 G
LEUKOCYTE ESTERASE UR QL STRIP: NEGATIVE
LV LATERAL E/E' RATIO: 11.88
LV SEPTAL E/E' RATIO: 7.92
LYMPHOCYTES # BLD AUTO: 2.4 K/UL
LYMPHOCYTES NFR BLD: 26 %
MAGNESIUM SERPL-MCNC: 1.2 MG/DL
MCH RBC QN AUTO: 30.1 PG
MCHC RBC AUTO-ENTMCNC: 31.6 G/DL
MCV RBC AUTO: 95 FL
MONOCYTES # BLD AUTO: 0.8 K/UL
MONOCYTES NFR BLD: 8.2 %
MV PEAK E VEL: 0.95 M/S
NEUTROPHILS # BLD AUTO: 5.8 K/UL
NEUTROPHILS NFR BLD: 63.6 %
NITRITE UR QL STRIP: NEGATIVE
NRBC BLD-RTO: 0 /100 WBC
PH UR STRIP: 7 [PH] (ref 5–8)
PHOSPHATE SERPL-MCNC: 3 MG/DL
PLATELET # BLD AUTO: 305 K/UL
PMV BLD AUTO: 9.9 FL
POTASSIUM SERPL-SCNC: 4 MMOL/L
PROT SERPL-MCNC: 6.3 G/DL
PROT UR QL STRIP: NEGATIVE
RA PRESSURE: 3 MMHG
RBC # BLD AUTO: 2.79 M/UL
RV TISSUE DOPPLER FREE WALL SYSTOLIC VELOCITY 1 (APICAL 4 CHAMBER VIEW): 11.14 M/S
SODIUM SERPL-SCNC: 137 MMOL/L
SP GR UR STRIP: 1.01 (ref 1–1.03)
TDI LATERAL: 0.08
TDI SEPTAL: 0.12
TDI: 0.1
URN SPEC COLLECT METH UR: NORMAL
WBC # BLD AUTO: 9.14 K/UL

## 2019-03-10 PROCEDURE — 63600175 PHARM REV CODE 636 W HCPCS

## 2019-03-10 PROCEDURE — 99232 PR SUBSEQUENT HOSPITAL CARE,LEVL II: ICD-10-PCS | Mod: ,,, | Performed by: INTERNAL MEDICINE

## 2019-03-10 PROCEDURE — 25000003 PHARM REV CODE 250: Performed by: PSYCHIATRY & NEUROLOGY

## 2019-03-10 PROCEDURE — 99232 SBSQ HOSP IP/OBS MODERATE 35: CPT | Mod: ,,, | Performed by: INTERNAL MEDICINE

## 2019-03-10 PROCEDURE — 83735 ASSAY OF MAGNESIUM: CPT

## 2019-03-10 PROCEDURE — 63600175 PHARM REV CODE 636 W HCPCS: Performed by: HOSPITALIST

## 2019-03-10 PROCEDURE — 81003 URINALYSIS AUTO W/O SCOPE: CPT

## 2019-03-10 PROCEDURE — 80053 COMPREHEN METABOLIC PANEL: CPT

## 2019-03-10 PROCEDURE — 85025 COMPLETE CBC W/AUTO DIFF WBC: CPT

## 2019-03-10 PROCEDURE — 25000003 PHARM REV CODE 250: Performed by: INTERNAL MEDICINE

## 2019-03-10 PROCEDURE — 84100 ASSAY OF PHOSPHORUS: CPT

## 2019-03-10 PROCEDURE — 20600001 HC STEP DOWN PRIVATE ROOM

## 2019-03-10 PROCEDURE — 82550 ASSAY OF CK (CPK): CPT

## 2019-03-10 RX ORDER — LANOLIN ALCOHOL/MO/W.PET/CERES
400 CREAM (GRAM) TOPICAL 2 TIMES DAILY
Status: DISCONTINUED | OUTPATIENT
Start: 2019-03-10 | End: 2019-03-10

## 2019-03-10 RX ORDER — LANOLIN ALCOHOL/MO/W.PET/CERES
400 CREAM (GRAM) TOPICAL 2 TIMES DAILY
Status: DISCONTINUED | OUTPATIENT
Start: 2019-03-10 | End: 2019-03-11

## 2019-03-10 RX ADMIN — ENOXAPARIN SODIUM 40 MG: 100 INJECTION SUBCUTANEOUS at 04:03

## 2019-03-10 RX ADMIN — MAGNESIUM OXIDE TAB 400 MG (241.3 MG ELEMENTAL MG) 400 MG: 400 (241.3 MG) TAB at 09:03

## 2019-03-10 RX ADMIN — MAGNESIUM OXIDE TAB 400 MG (241.3 MG ELEMENTAL MG) 400 MG: 400 (241.3 MG) TAB at 08:03

## 2019-03-10 RX ADMIN — ESLICARBAZEPINE ACETATE 1600 MG: 400 TABLET ORAL at 08:03

## 2019-03-10 RX ADMIN — PREDNISONE 60 MG: 20 TABLET ORAL at 09:03

## 2019-03-10 NOTE — PROGRESS NOTES
Hospital Medicine   Progress note   Team: [facility] O Hui Ferugson MD   Admit Date: 3/7/2019   DOLORES    Code status: Full Code   Principal Problem: Pericardial tamponade     Hospital Course  Pinky Lanier is a 29 y.o. lady with a previous seizure disorder who presents as a transfer from [facility] in [place] for further evaluation for pericardial effusion.  Per chart review, patient was recently admitted to on 2/25/2019 for acute chest pain.  She was diagnosed with acute, likely viral pericarditis and was noted to have moderate pericardial effusion without tamponade physiology.  She was started on colchicine 0.6 mg PO daily and ibuprofen.  She was subsequently discharged on 2/27 in good condition, however she had noticed worsening severe chest pain that was associated with light headedness, dizziness, and dyspnea.  She then presented to [facility] where she was noted to have hypotension with a BP of 90/60.  There, a CT chest revealed a large pericardial effusion as well as bilateral pleural effusions, R > L.  She was started on a levophed gtt for BP management.  Subsequent 2D echo revealed a large effusion with a maximum thickness of 3.2 cm without evidence of collapse of the R ventricle.  She underwent subsequent pericardial drain placement with initial drain output of 500 ccs per shift.  This eventually improved to approximately 50 ccs within the shift prior to transfer.  Fluid analysis noted 13k WBCs, low pH, and glucose in the 60s; gram stain was negative.  Other labs at the time revealed hypoalbuminemia and hypocomplementemia, where there was concern that the underlying effusions were secondary to lupus.  She was started on methylpred 125 mg IV daily.  CBCs at the time mostly revealed an anemia, but otherwise no leukopenia/leukocytosis or thrombocytopenia.  CMP was also unremarkable with normal kidney function.  She was noted there to have an elevated urine protein. Additionally, neurology  was consulted, who performed an EEG without any epileptiform activity noted.  She was started on keppra, continued on eslicarbazepine, and discontinued phenytoin.  ELVER and anti-histone reported returned with elevated ELVER, but none were reported.      She was transferred to [facility] on 3/7/2019.  She notes that she has never had chest pain like this previously. Denies any personal history of skin rashes, light sensitivity rashes, malar rashes, oral ulcers, raynaud's phenomenon, or arthritis.  Denies any family history of lupus or any other rheumatologic disease.  Denies any history of miscarriages or DVTs.  Currently living with her boyfriend.       She was admitted to MICU on 3/7. Pericardial drain pulled today 3/8. Did have some encephalopathy yesterday which has improved today. Unclear if this was 2/2 addition of keppra at OSH vs neuropsych SLE. Neurology consulted and recommended discontinuing Keppra, increasing aptiom to 1600mg/d and MRI. Cardiology following and recommend repeat TTE in 2-3 days. Rheumatology also on board.    Interval hx: Patient transferred out of ICU 3/8/19.  No events overnight.  She continues to deny any shortness of breath or chest pain. Mental status completely normal today.  Mom does note patient has hx of learning disability.      ROS   Constitutional: Negative for chills, fever. +fatigue  HENT: Negative for sore throat, trouble swallowing.    Eyes: Negative for photophobia, visual disturbance.   Respiratory: Negative for cough, shortness of breath.    Cardiovascular: Negative for palpitations, leg swelling. +chest pain  Gastrointestinal: Negative for abdominal pain, constipation, diarrhea, nausea, vomiting.   Endocrine: Negative for cold intolerance, heat intolerance.   Genitourinary: Negative for dysuria, frequency.   Musculoskeletal: Negative for myalgias. +arthralgias  Skin: Negative for rash, wound, erythema   Neurological: Negative for dizziness, syncope, weakness, light-headedness.    Psychiatric/Behavioral: Negative for confusion, hallucinations, anxiety  All other systems reviewed and are negative.    PEx   Temp:  [97.4 °F (36.3 °C)-98.4 °F (36.9 °C)]   Pulse:  []   Resp:  [14-18]   BP: (107-120)/(66-75)   SpO2:  [91 %-99 %]      I & O (Last 24H):     Intake/Output Summary (Last 24 hours) at 3/10/2019 0808  Last data filed at 3/10/2019 0600  Gross per 24 hour   Intake 1020 ml   Output 1050 ml   Net -30 ml       General appearance: no distress, alert and oriented x 3  HEENT:  conjunctivae/corneas clear, PERRL, mucous membranes moist   Neck: supple, thyroid not enlarged  Pulm:   normal respiratory effort, CTAB, no wheezes, rales or rhonchi  Card: RRR, S1, S2 normal, no murmur, click, rub or gallop  Abd: soft, NT, ND, BS present; no masses, no organomegaly  Ext:  Mild effusion noted in bilateral knees, no warmth or erythema noted  Pulses: 2+, symmetric  Skin:  Few hyperpigmented areas noted on bilateral lower extremities  Neuro: CN II-XII grossly intact, no focal numbness or weakness, normal strength and tone   Psych: normal mood and affect      Recent Results (from the past 24 hour(s))   Comprehensive metabolic panel    Collection Time: 03/10/19  5:45 AM   Result Value Ref Range    Sodium 137 136 - 145 mmol/L    Potassium 4.0 3.5 - 5.1 mmol/L    Chloride 103 95 - 110 mmol/L    CO2 30 (H) 23 - 29 mmol/L    Glucose 91 70 - 110 mg/dL    BUN, Bld 10 6 - 20 mg/dL    Creatinine 0.5 0.5 - 1.4 mg/dL    Calcium 8.2 (L) 8.7 - 10.5 mg/dL    Total Protein 6.3 6.0 - 8.4 g/dL    Albumin 1.8 (L) 3.5 - 5.2 g/dL    Total Bilirubin 0.1 0.1 - 1.0 mg/dL    Alkaline Phosphatase 82 55 - 135 U/L    AST 98 (H) 10 - 40 U/L    ALT 49 (H) 10 - 44 U/L    Anion Gap 4 (L) 8 - 16 mmol/L    eGFR if African American >60.0 >60 mL/min/1.73 m^2    eGFR if non African American >60.0 >60 mL/min/1.73 m^2   Magnesium    Collection Time: 03/10/19  5:45 AM   Result Value Ref Range    Magnesium 1.2 (L) 1.6 - 2.6 mg/dL   Phosphorus     Collection Time: 03/10/19  5:45 AM   Result Value Ref Range    Phosphorus 3.0 2.7 - 4.5 mg/dL   CBC auto differential    Collection Time: 03/10/19  5:45 AM   Result Value Ref Range    WBC 9.14 3.90 - 12.70 K/uL    RBC 2.79 (L) 4.00 - 5.40 M/uL    Hemoglobin 8.4 (L) 12.0 - 16.0 g/dL    Hematocrit 26.6 (L) 37.0 - 48.5 %    MCV 95 82 - 98 fL    MCH 30.1 27.0 - 31.0 pg    MCHC 31.6 (L) 32.0 - 36.0 g/dL    RDW 14.8 (H) 11.5 - 14.5 %    Platelets 305 150 - 350 K/uL    MPV 9.9 9.2 - 12.9 fL    Immature Granulocytes 0.7 (H) 0.0 - 0.5 %    Gran # (ANC) 5.8 1.8 - 7.7 K/uL    Immature Grans (Abs) 0.06 (H) 0.00 - 0.04 K/uL    Lymph # 2.4 1.0 - 4.8 K/uL    Mono # 0.8 0.3 - 1.0 K/uL    Eos # 0.1 0.0 - 0.5 K/uL    Baso # 0.01 0.00 - 0.20 K/uL    nRBC 0 0 /100 WBC    Gran% 63.6 38.0 - 73.0 %    Lymph% 26.0 18.0 - 48.0 %    Mono% 8.2 4.0 - 15.0 %    Eosinophil% 1.4 0.0 - 8.0 %    Basophil% 0.1 0.0 - 1.9 %    Differential Method Automated    CK    Collection Time: 03/10/19  5:45 AM   Result Value Ref Range     (H) 20 - 180 U/L       No results for input(s): POCTGLUCOSE in the last 168 hours.    No results found for: HGBA1C     Active Hospital Problems    Diagnosis  POA    *Pericardial tamponade [I31.4]  Yes    Seizure disorder [G40.909]  Unknown    Anemia [D64.9]  Unknown    Pericardial effusion [I31.3]  Yes    ELVER positive [R76.8]  No      Resolved Hospital Problems   No resolved problems to display.         enoxaparin  40 mg Subcutaneous Daily    eslicarbazepine  1,600 mg Oral QHS    predniSONE  60 mg Oral Daily     acetaminophen, morphine, ondansetron, ondansetron, sodium chloride 0.9%    MRI brain  Impression       No acute intracranial abnormality identified on today's examination.  Specifically, no evidence of acute infarction or enhancing lesion.         Assessment and Plan for problems addressed today:   1. Pericardial tamponade  - noted tamponade s/p pericardial drain  - high consideration for connective  tissue disease/autoimmune disease as cause   - overall, labs suspicious for SLE  - hold off antibiotics given no growth per OSH  - rheumatology consulted, appreciate recommendations and assistance. On IV steroids per their recommendation, transitioned to PO prednisone 60mg  - cards following  - pericardial drain removed 3/8/19  - cards recommending limited STEFFI in 2-3 days, ordered for today    2. Seizure disorder  - known previous history, states taking medication since her childhood  - previously on aptiom and dilantin  - dilantin discontinued given risk for SLE, started on keppra but discontinued  - currently on home aptiom per neuro recs  - some confusion noted in ICU, MRI brain performed  - Neurology consulted, appreciate recs  - MRI brain nonacute     3. ELVER positive  - + ELVER in setting of pericarditis and pleural effusion  - Highly suspicious for SLE  - Appreciate rheumatology recs  - left knee aspirated per rheum, c/w inflammatory arthritis  - CPK elevated  - checking arthritis Xrays     4. Anemia  - noted anemia with MCV of 97  - hemodynamically stable  - no evidence of acute blood loss  - transfuse < 7 as needed   - daily CBCs    5. Hypomagnesemia  - replace  - BMP in a.m.    6. Hypokalemia, resolved  - BMP in a.m.    7. Acute encephalopathy, resolved  - MRI brain nonacute    8. Proteinuria  - possible renal involvement given blood/protein on outside UA  - repeat UA- no protein   - obtain 24hr urine protein   - may end up needing renal biopsy        Diet: regular  DVT PPx: lovenox  Code status: FULL    Discharge plan and follow up:  Follow up Rheumatology recs, repeat STEFFI today, appreciate neuro input in seizure medication management, replace dion Ferguson MD  Hospital Medicine Staff  [phone] pager

## 2019-03-10 NOTE — PLAN OF CARE
Problem: Adult Inpatient Plan of Care  Goal: Plan of Care Review  Outcome: Ongoing (interventions implemented as appropriate)  Patient free of falls/traumas/injuries. Chest X-Ray and arthritis survey completed today. Replaced K+ and Mg+. Skin clean, dry, and intact. Patient educated on plan of care and verbalized understanding. Patients VS stable and no distress. Will continue to monitor.

## 2019-03-10 NOTE — PROGRESS NOTES
Contacted Field ANDREWS for explanation of the IJ placement in Pt. MD informed me that that all she knew was what's in the notes. Sense IJ imposes a infection risk to Pt, I was going to inquire about pulling it for the safety of the Pt if not medically necessary. Will cont to monitor.

## 2019-03-10 NOTE — PLAN OF CARE
Problem: Adult Inpatient Plan of Care  Goal: Plan of Care Review  Outcome: Revised  Pt. free of falls/trauma/injuries. Denies CO pain, discomfort, and SOB. Pt. being treated with Lovinox as ordered. Plan to determine what is causing her condition. Pt. tolerating plan of care.

## 2019-03-11 PROBLEM — J90 PLEURAL EFFUSION: Status: ACTIVE | Noted: 2019-03-11

## 2019-03-11 LAB
ALBUMIN SERPL BCP-MCNC: 1.8 G/DL
ALP SERPL-CCNC: 89 U/L
ALT SERPL W/O P-5'-P-CCNC: 52 U/L
ANION GAP SERPL CALC-SCNC: 6 MMOL/L
ANTI SM ANTIBODY: 61.68 EU
ANTI SM/RNP ANTIBODY: 188.87 EU
ANTI-SM INTERPRETATION: POSITIVE
ANTI-SM/RNP INTERPRETATION: POSITIVE
ANTI-SSA ANTIBODY: 3.41 EU
ANTI-SSA INTERPRETATION: NEGATIVE
ANTI-SSB ANTIBODY: 7.46 EU
ANTI-SSB INTERPRETATION: NEGATIVE
AST SERPL-CCNC: 97 U/L
BASOPHILS # BLD AUTO: 0.01 K/UL
BASOPHILS NFR BLD: 0.1 %
BILIRUB SERPL-MCNC: 0.1 MG/DL
BUN SERPL-MCNC: 12 MG/DL
CALCIUM SERPL-MCNC: 7.9 MG/DL
CHLORIDE SERPL-SCNC: 102 MMOL/L
CK SERPL-CCNC: 858 U/L
CO2 SERPL-SCNC: 26 MMOL/L
CREAT SERPL-MCNC: 0.5 MG/DL
DIFFERENTIAL METHOD: ABNORMAL
DSDNA AB SER-ACNC: ABNORMAL [IU]/ML
DSDNA AB SER-ACNC: ABNORMAL [IU]/ML
EOSINOPHIL # BLD AUTO: 0.2 K/UL
EOSINOPHIL NFR BLD: 1.9 %
ERYTHROCYTE [DISTWIDTH] IN BLOOD BY AUTOMATED COUNT: 15.4 %
EST. GFR  (AFRICAN AMERICAN): >60 ML/MIN/1.73 M^2
EST. GFR  (NON AFRICAN AMERICAN): >60 ML/MIN/1.73 M^2
GLUCOSE SERPL-MCNC: 91 MG/DL
HBV CORE AB SERPL QL IA: NEGATIVE
HBV SURFACE AG SERPL QL IA: NEGATIVE
HCT VFR BLD AUTO: 28 %
HGB BLD-MCNC: 8.7 G/DL
IMM GRANULOCYTES # BLD AUTO: 0.08 K/UL
IMM GRANULOCYTES NFR BLD AUTO: 0.9 %
LYMPHOCYTES # BLD AUTO: 2.1 K/UL
LYMPHOCYTES NFR BLD: 25.3 %
MAGNESIUM SERPL-MCNC: 1.2 MG/DL
MCH RBC QN AUTO: 30.4 PG
MCHC RBC AUTO-ENTMCNC: 31.1 G/DL
MCV RBC AUTO: 98 FL
MONOCYTES # BLD AUTO: 0.8 K/UL
MONOCYTES NFR BLD: 9.1 %
NEUTROPHILS # BLD AUTO: 5.3 K/UL
NEUTROPHILS NFR BLD: 62.7 %
NRBC BLD-RTO: 0 /100 WBC
PHOSPHATE SERPL-MCNC: 3 MG/DL
PLATELET # BLD AUTO: 332 K/UL
PMV BLD AUTO: 10.1 FL
POTASSIUM SERPL-SCNC: 4 MMOL/L
PROT SERPL-MCNC: 6.3 G/DL
RBC # BLD AUTO: 2.86 M/UL
SODIUM SERPL-SCNC: 134 MMOL/L
WBC # BLD AUTO: 8.45 K/UL

## 2019-03-11 PROCEDURE — 86235 NUCLEAR ANTIGEN ANTIBODY: CPT

## 2019-03-11 PROCEDURE — 86235 NUCLEAR ANTIGEN ANTIBODY: CPT | Mod: 59

## 2019-03-11 PROCEDURE — 94799 UNLISTED PULMONARY SVC/PX: CPT

## 2019-03-11 PROCEDURE — 99232 SBSQ HOSP IP/OBS MODERATE 35: CPT | Mod: GC,,, | Performed by: INTERNAL MEDICINE

## 2019-03-11 PROCEDURE — 99900035 HC TECH TIME PER 15 MIN (STAT)

## 2019-03-11 PROCEDURE — 94761 N-INVAS EAR/PLS OXIMETRY MLT: CPT

## 2019-03-11 PROCEDURE — 85025 COMPLETE CBC W/AUTO DIFF WBC: CPT

## 2019-03-11 PROCEDURE — 87340 HEPATITIS B SURFACE AG IA: CPT

## 2019-03-11 PROCEDURE — 86704 HEP B CORE ANTIBODY TOTAL: CPT

## 2019-03-11 PROCEDURE — 99232 PR SUBSEQUENT HOSPITAL CARE,LEVL II: ICD-10-PCS | Mod: GC,,, | Performed by: INTERNAL MEDICINE

## 2019-03-11 PROCEDURE — 84100 ASSAY OF PHOSPHORUS: CPT

## 2019-03-11 PROCEDURE — 83516 IMMUNOASSAY NONANTIBODY: CPT | Mod: 59

## 2019-03-11 PROCEDURE — 63600175 PHARM REV CODE 636 W HCPCS: Performed by: HOSPITALIST

## 2019-03-11 PROCEDURE — 36415 COLL VENOUS BLD VENIPUNCTURE: CPT

## 2019-03-11 PROCEDURE — 83735 ASSAY OF MAGNESIUM: CPT

## 2019-03-11 PROCEDURE — 83520 IMMUNOASSAY QUANT NOS NONAB: CPT | Mod: 59

## 2019-03-11 PROCEDURE — 83520 IMMUNOASSAY QUANT NOS NONAB: CPT

## 2019-03-11 PROCEDURE — 25000003 PHARM REV CODE 250: Performed by: INTERNAL MEDICINE

## 2019-03-11 PROCEDURE — 82085 ASSAY OF ALDOLASE: CPT

## 2019-03-11 PROCEDURE — 63600175 PHARM REV CODE 636 W HCPCS

## 2019-03-11 PROCEDURE — 25000003 PHARM REV CODE 250: Performed by: PSYCHIATRY & NEUROLOGY

## 2019-03-11 PROCEDURE — 99232 PR SUBSEQUENT HOSPITAL CARE,LEVL II: ICD-10-PCS | Mod: ,,, | Performed by: INTERNAL MEDICINE

## 2019-03-11 PROCEDURE — 82550 ASSAY OF CK (CPK): CPT

## 2019-03-11 PROCEDURE — 80053 COMPREHEN METABOLIC PANEL: CPT

## 2019-03-11 PROCEDURE — 99232 SBSQ HOSP IP/OBS MODERATE 35: CPT | Mod: ,,, | Performed by: INTERNAL MEDICINE

## 2019-03-11 PROCEDURE — 20600001 HC STEP DOWN PRIVATE ROOM

## 2019-03-11 PROCEDURE — 86480 TB TEST CELL IMMUN MEASURE: CPT

## 2019-03-11 RX ORDER — LANOLIN ALCOHOL/MO/W.PET/CERES
800 CREAM (GRAM) TOPICAL ONCE
Status: COMPLETED | OUTPATIENT
Start: 2019-03-11 | End: 2019-03-11

## 2019-03-11 RX ORDER — LIDOCAINE HYDROCHLORIDE 10 MG/ML
10 INJECTION, SOLUTION EPIDURAL; INFILTRATION; INTRACAUDAL; PERINEURAL ONCE
Status: DISCONTINUED | OUTPATIENT
Start: 2019-03-11 | End: 2019-03-13 | Stop reason: HOSPADM

## 2019-03-11 RX ADMIN — ESLICARBAZEPINE ACETATE 1600 MG: 400 TABLET ORAL at 08:03

## 2019-03-11 RX ADMIN — ENOXAPARIN SODIUM 40 MG: 100 INJECTION SUBCUTANEOUS at 05:03

## 2019-03-11 RX ADMIN — PREDNISONE 60 MG: 20 TABLET ORAL at 09:03

## 2019-03-11 RX ADMIN — MAGNESIUM OXIDE TAB 400 MG (241.3 MG ELEMENTAL MG) 800 MG: 400 (241.3 MG) TAB at 09:03

## 2019-03-11 NOTE — PROGRESS NOTES
[facility]  Rheumatology  Progress Note    Patient Name: Pinyk Lanier  MRN: [id number]  Admission Date: 3/7/2019  Hospital Length of Stay: 4 days  Code Status: Full Code   Attending Provider: Hui Ferguson MD  Primary Care Physician: Primary Doctor No  Principal Problem: Pericardial tamponade    Subjective:     HPI: Pinky Lanier is a 29 year old female with past medical history of seizure disorder (since the age of 1 mos) who presents as a transfer from [facility] in [place] for further evaluation for pericardial effusion and pleural effusions.     Patient was recently admitted to [facility] on 2/25/2019 for acute chest pain.She was diagnosed with acute, likely viral pericarditis and was noted to have moderate pericardial effusion without tamponade physiology.  She was started on colchicine 0.6 mg PO BID and ibuprofen 600mg tid .  She was subsequently discharged on 2/27 in good condition, however she had noticed worsening severe chest pain that was associated with light headedness, dizziness, and dyspnea.    She then presented to [facility] on 3/3 where she was noted to have hypotension with a BP of 90/60.  There, a CT chest revealed a large pericardial effusion as well as bilateral pleural effusions, R > L.  She was started on a levophed gtt for BP management.  Subsequent 2D echo revealed a large effusion with a maximum thickness of 3.2 cm without evidence of collapse of the R ventricle.  She underwent subsequent pericardial drain placement 3/4/19 with initial drain output of 500 ccs per shift.  This eventually improved to approximately 50 ccs within the shift prior to transfer.  Fluid analysis noted 13k WBCs, low pH, and glucose in the 60s; gram stain was negative.  Other labs at the time revealed hypoalbuminemia and hypocomplementemia, and positve ELVER. where there was concern that the underlying effusions were secondary to lupus.  She was initially started on prednisone  30mg and then increased to methylpred 125 mg IV daily.   Neurology was also who performed an EEG which showed cortical irratlibility from the left frontal central parietal region as well as mild encephalopathy, no seizure activity.  She was started on keppra, continued on eslicarbazepine, and discontinued on phenytoin.      She was transferred to [facility] on 3/7/2019.  Here she was intimally somnolent but then later awake and alert. She denies any chest pain, shortness of breath, rashes, joint pain or swelling, no raynads, oral or nasal ulcers.   No family history of SLE other autoimmune conditions           Interval History: no acute overnight events. No chest pain, no sob, pt is walking to bathroom and was sitting in chair earlier today. No new rashes, myalgias, arthralgias, no oral or nasal ulcerations.     Current Facility-Administered Medications   Medication Frequency    acetaminophen tablet 650 mg Q4H PRN    enoxaparin injection 40 mg Daily    eslicarbazepine Tab 1,600 mg QHS    morphine injection 2 mg On Call Procedure    ondansetron disintegrating tablet 8 mg Q8H PRN    ondansetron injection 4 mg Q8H PRN    predniSONE tablet 60 mg Daily    sodium chloride 0.9% flush 3 mL PRN     Objective:     Vital Signs (Most Recent):  Temp: 98.4 °F (36.9 °C) (03/11/19 0810)  Pulse: 96 (03/11/19 1044)  Resp: 18 (03/11/19 0810)  BP: 112/63 (03/11/19 0810)  SpO2: 95 % (03/11/19 0810)  O2 Device (Oxygen Therapy): room air (03/10/19 1931) Vital Signs (24h Range):  Temp:  [97.4 °F (36.3 °C)-98.4 °F (36.9 °C)] 98.4 °F (36.9 °C)  Pulse:  [] 96  Resp:  [18] 18  SpO2:  [94 %-97 %] 95 %  BP: ()/(58-78) 112/63     Weight: 55.4 kg (122 lb 2.2 oz) (03/11/19 0800)  Body mass index is 20.32 kg/m².  Body surface area is 1.59 meters squared.      Intake/Output Summary (Last 24 hours) at 3/11/2019 1058  Last data filed at 3/11/2019 1035  Gross per 24 hour   Intake 720 ml   Output 1250 ml   Net -530 ml       Physical Exam    Nursing note reviewed.  Constitutional: She is oriented to person, place, and time and well-developed, well-nourished, and in no distress. No distress.   HENT:   Head: Normocephalic and atraumatic.   Right Ear: External ear normal.   Left Ear: External ear normal.   Mouth/Throat: Oropharynx is clear and moist. No oropharyngeal exudate.   Eyes: Conjunctivae are normal. Right eye exhibits no discharge. Left eye exhibits no discharge. No scleral icterus.   Neck: Normal range of motion. Neck supple.   Cardiovascular: Normal rate, regular rhythm and normal heart sounds.    No murmur heard.  Pulmonary/Chest: Effort normal. She has no wheezes.   Decreased breath sounds in bases    Abdominal: Soft. Bowel sounds are normal. She exhibits no distension.   Lymphadenopathy:     She has no cervical adenopathy.   Neurological: She is alert and oriented to person, place, and time.   Skin: Skin is warm and dry. Rash noted. She is not diaphoretic.     Hyperpigmented areas on LE b/l    Psychiatric: Mood and affect normal.   Musculoskeletal: Normal range of motion. She exhibits no edema.   No effusions, enthesitis, dactylitis or synovitis appreciated on exam         Significant Labs:  BMP:   Recent Labs   Lab 03/11/19  0512   GLU 91   *   K 4.0      CO2 26   BUN 12   CREATININE 0.5   CALCIUM 7.9*   MG 1.2*     CBC:   Recent Labs   Lab 03/11/19 0512   WBC 8.45   HGB 8.7*   HCT 28.0*        CMP:   Recent Labs   Lab 03/11/19 0512   GLU 91   CALCIUM 7.9*   ALBUMIN 1.8*   PROT 6.3   *   K 4.0   CO2 26      BUN 12   CREATININE 0.5   ALKPHOS 89   ALT 52*   AST 97*   BILITOT 0.1       Significant Imaging:  Imaging results within the past 24 hours have been reviewed.    Assessment/Plan:     ELVER positive    Pinky Lanier is a 29 year old female with past medical history of seizure disorder (since the age of 1 mos) who presents as a transfer from [facility] in [place] for further evaluation for  pericardial effusion and pleural effusions.      Patient was recently admitted to [facility] on 2/25/2019 for acute chest pain.She was diagnosed with acute, likely viral pericarditis and was noted to have moderate pericardial effusion without tamponade physiology.  She was started on colchicine 0.6 mg PO BID and ibuprofen 600mg tid .  She was subsequently discharged on 2/27 in good condition, however she had noticed worsening severe chest pain that was associated with light headedness, dizziness, and dyspnea.    She then presented to [facility] on 3/3 where she was noted to have hypotension with a BP of 90/60.  There, a CT chest revealed a large pericardial effusion as well as bilateral pleural effusions, R > L.  She was started on a levophed gtt for BP management.  Subsequent 2D echo revealed a large effusion with a maximum thickness of 3.2 cm without evidence of collapse of the R ventricle.  She underwent subsequent pericardial drain placement 3/4/19 with initial drain output of 500 ccs per shift.  This eventually improved to approximately 50 ccs within the shift prior to transfer.  Fluid analysis noted 13k WBCs, low pH, and glucose in the 60s; gram stain was negative.  Other labs at the time revealed hypoalbuminemia and hypocomplementemia, and positve ELVER. where there was concern that the underlying effusions were secondary to lupus.  She was initially started on prednisone 30mg and then increased to methylpred 125 mg IV daily.   Neurology was also who performed an EEG which showed cortical irratlibility from the left frontal central parietal region as well as mild encephalopathy, no seizure activity.  She was started on keppra, continued on eslicarbazepine, and discontinued on phenytoin.      She was [facility] [place] 3/7/2019.  Here she was initially somnolent on initial consult day but then later awake and alert. She denies any chest pain, shortness of breath, rashes, joint pain or swelling, no  raynads, oral or nasal ulcers.   No family history of SLE other autoimmune conditions       Labs showing transaminitis, elevated CPK/aldolase, albumin 1.8, sed rate 56, CRP 43.5. +ELVER 1:2560 C3 70 and C4 15, RAF negative. +antihistone ab, +dsDNA.  Blood cx NGTD, TSH 1.9, FT4 0.49, lupus anticoagulant negative, cardiolipin IgM 20.9, beta 2 glycoprotein IgA 23, left knee aspirate showing wbc 2,100 (83% lymphocytes), no crystals culture NGTD, UA negative UPC 0.37   Imaging showing CXR showing b/l pleural effusions and cardiomegaly. CT head with no acute abnormalities. MRI brain normal.       Problem list:  Pericarditis with pericardial effusion s/p pericardial drain placement (drain removed 3/8)  Pleural effusions b/l, left > right  transaminitis   Raynauds   +ELVER 2560 (history of low complements although here normal), +dsDNA, +antihistone Ab  +sm/RNP  Elevated CPK , could be lupus myositis vs from seizures    Patient meets SLICC criteria for SLE (arthritis, Serositis, +ELVER, +SM/RNP, low complements in past, +dsDNA, +antihistone ab).     Treatments thus far:  Solumedrol 125mg IV 3/4- 3/8  Prednisone 60mg started 3/9 to present    PLAN:  -recommend changing prednisone 60mg daily to prednisone 30mg BID.  -recommend starting PPI for GI ppx while on high dose steroids  -appreciate neurology recs   -continue to trend LFTs, CPK and aldolase daily  -repeat echocardiogram showing large left pleural effusion, recommend pulmonary consult to comment on pleural effusion and if need for thoracocentesis.  -obtain myomarker panel, Scl70, RNA polymerase III, quantiferon gold, hepatitis B core ab and hepatitis b surface antigen.  -patient with positive beta 2 glycoprotein and cardiolipin, will need repeat in 12 weeks   -left knee aspirated 3/8: fluid consistent with inflammatory arthritis; but arthritis survey without erosions.  -she has low albumin 1.7, please consult nutrition    -pt to follow up with rheumatology closer to home as  she can not drive 2/2 epilepsy.  Dr José Luis Becker rheumatology is located in [place], [place].                Liset Templeton, DO  Rheumatology  [facility]      RHEUMATOLOGY ATTENDING:  Patient presented, personally interviewed and examined, medical records reviewed.  29 yr old lady w seizure disorder since infancy transferred from [facility] for pericardial & pleural effusions requiring drain. Had some joint pain with swelling in hands and knees and discoloration of hands c/w Raynaud's. Also some hyperpigmentation of hands reported. Started on steroids and w/u revealed +ELVER 1:2560 sp with +Sm & RNP;  + ds DNA 1:80. Anti histone 5.4 (0.9); C3 & C4 ok at 70 & 15 but apparently were low at [facility]. Has mildly elevated IgM  aCLA but LAC neg; RAF neg;  CPK elevated x 4 at 909, 758, 728 & 858 with elevated aldolase of 19.7; ESR 56; CBC w mild anemia; & mild thrombocytosis; CMP with alb. 1.7; ; ALT 59; Na 135;   Results of aspiration of L knee c/w SLE arthritis w WBC of 2100 mostly lymphs.   CXR today with probable left pleural effusion  Currently on prednisone 60 mg po wo adverse effects.  Today remains in good spirits and has no complaints.   Rx: we will finish w/u and add HCQ before discharge.   Findings discussed with patient & Dr. Templeton whose note and assessment I agree gloria

## 2019-03-11 NOTE — PHYSICIAN QUERY
"PT Name: Pinky Lanier  MR #: [id number]    Physician Query Form - Hematology Clarification      CDS: Coco Garcia RN    Contact information:[email or web address]    This form is a permanent document in the medical record.      Query Date: March 11, 2019    By submitting this query, we are merely seeking further clarification of documentation. Please utilize your independent clinical judgment when addressing the question(s) below.    The Medical record contains the following:   Indicators  Supporting Clinical Findings Location in Medical Record   x "Anemia" documented Anemia  - noted anemia with MCV of 97  - hemodynamically stable  - iron, TIBC, and ferritin ordered  - no evidence of acute blood loss  - transfuse < 7 as needed   - daily CBCs H&P 3/7   x H & H =  3/7 3/8 3/9 3/10 3/11   Hgb 9.6  10.2  8.6  8.4  8.7    Hct 30.8  32.6 27.9  26.6  28.0     Lab Results    BP =                     HR=      "GI bleeding" documented      Acute bleeding (Non GI site)      Transfusion(s)      Treatment:     x Other:  29 y.o. lady with a previous seizure disorder who presents as a transfer from [facility] in [place] for further evaluation for pericardial effusion.      3/7   Iron 59   TIBC 115 (L)   Saturated Iron 51 (H)   Transferrin 78 (L)   Ferritin 201    H&P 3/7        Lab Results     Provider, please specify diagnosis or diagnoses associated with above clinical findings.    [  ] Iron deficiency anemia   [  ] Chronic blood loss anemia     [X  ] Anemia of chronic disease ( Specify chronic disease)       [X  ] Other (Specify): SLE   [  ] Clinically Undetermined       [  ] Other Hematological Diagnosis (please specify):     [  ] Clinically Undetermined       Please document in your progress notes daily for the duration of treatment, until resolved, and include in your discharge summary.                                                                                                      "

## 2019-03-11 NOTE — PROGRESS NOTES
Hospital Medicine   Progress note   Team: [facility] HOSP MED O Hui Ferguson MD   Admit Date: 3/7/2019   DOLORES    Code status: Full Code   Principal Problem: Pericardial tamponade     Hospital Course  Pinky Lanier is a 29 y.o. lady with a previous seizure disorder who presents as a transfer from [facility] in [place] for further evaluation for pericardial effusion.  Per chart review, patient was recently admitted to on 2/25/2019 for acute chest pain.  She was diagnosed with acute, likely viral pericarditis and was noted to have moderate pericardial effusion without tamponade physiology.  She was started on colchicine 0.6 mg PO daily and ibuprofen.  She was subsequently discharged on 2/27 in good condition, however she had noticed worsening severe chest pain that was associated with light headedness, dizziness, and dyspnea.  She then presented to [facility] where she was noted to have hypotension with a BP of 90/60.  There, a CT chest revealed a large pericardial effusion as well as bilateral pleural effusions, R > L.  She was started on a levophed gtt for BP management.  Subsequent 2D echo revealed a large effusion with a maximum thickness of 3.2 cm without evidence of collapse of the R ventricle.  She underwent subsequent pericardial drain placement with initial drain output of 500 ccs per shift.  This eventually improved to approximately 50 ccs within the shift prior to transfer.  Fluid analysis noted 13k WBCs, low pH, and glucose in the 60s; gram stain was negative.  Other labs at the time revealed hypoalbuminemia and hypocomplementemia, where there was concern that the underlying effusions were secondary to lupus.  She was started on methylpred 125 mg IV daily.  CBCs at the time mostly revealed an anemia, but otherwise no leukopenia/leukocytosis or thrombocytopenia.  CMP was also unremarkable with normal kidney function.  She was noted there to have an elevated urine protein. Additionally, neurology  was consulted, who performed an EEG without any epileptiform activity noted.  She was started on keppra, continued on eslicarbazepine, and discontinued phenytoin.  ELVER and anti-histone reported returned with elevated ELVER, but none were reported.      She was transferred to [facility] on 3/7/2019.  She notes that she has never had chest pain like this previously. Denies any personal history of skin rashes, light sensitivity rashes, malar rashes, oral ulcers, raynaud's phenomenon, or arthritis.  Denies any family history of lupus or any other rheumatologic disease.  Denies any history of miscarriages or DVTs.  Currently living with her boyfriend.       She was admitted to MICU on 3/7. Pericardial drain pulled today 3/8. Did have some encephalopathy yesterday which has improved today. Unclear if this was 2/2 addition of keppra at OSH vs neuropsych SLE. Neurology consulted and recommended discontinuing Keppra, increasing aptiom to 1600mg/d and MRI. Cardiology following and recommend repeat TTE in 2-3 days. Rheumatology also on board.    Interval hx: Patient transferred out of ICU 3/8/19.  No events overnight.  Repeat STEFFI only showed a trivial pericardial effusion.  Patient has decreased breath sounds on exam, chest x-ray shows atelectasis.  Awaiting Rheumatology recommendations.      ROS   Constitutional: Negative for chills, fever. +fatigue  HENT: Negative for sore throat, trouble swallowing.    Eyes: Negative for photophobia, visual disturbance.   Respiratory: Negative for cough, shortness of breath.    Cardiovascular: Negative for palpitations, leg swelling. +chest pain  Gastrointestinal: Negative for abdominal pain, constipation, diarrhea, nausea, vomiting.   Endocrine: Negative for cold intolerance, heat intolerance.   Genitourinary: Negative for dysuria, frequency.   Musculoskeletal: Negative for myalgias. +arthralgias  Skin: Negative for rash, wound, erythema   Neurological: Negative for dizziness, syncope, weakness,  light-headedness.   Psychiatric/Behavioral: Negative for confusion, hallucinations, anxiety  All other systems reviewed and are negative.    PEx   Temp:  [97.4 °F (36.3 °C)-98.9 °F (37.2 °C)]   Pulse:  []   Resp:  [18]   BP: ()/(58-73)   SpO2:  [94 %-99 %]      I & O (Last 24H):     Intake/Output Summary (Last 24 hours) at 3/11/2019 1534  Last data filed at 3/11/2019 1035  Gross per 24 hour   Intake 480 ml   Output 650 ml   Net -170 ml       General appearance: no distress, alert and oriented x 3  HEENT:  conjunctivae/corneas clear, PERRL, mucous membranes moist   Neck: supple, thyroid not enlarged  Pulm:   normal respiratory effort, decreased BS in bases, few rales in bases, no wheezes or rhonchi  Card: RRR, S1, S2 normal, no murmur, click, rub or gallop  Abd: soft, NT, ND, BS present; no masses, no organomegaly  Ext:  Mild effusion noted in bilateral knees, no warmth or erythema noted  Pulses: 2+, symmetric  Skin:  Few hyperpigmented areas noted on bilateral lower extremities  Neuro: CN II-XII grossly intact, no focal numbness or weakness, normal strength and tone   Psych: normal mood and affect      Recent Results (from the past 24 hour(s))   CBC auto differential    Collection Time: 03/11/19  5:12 AM   Result Value Ref Range    WBC 8.45 3.90 - 12.70 K/uL    RBC 2.86 (L) 4.00 - 5.40 M/uL    Hemoglobin 8.7 (L) 12.0 - 16.0 g/dL    Hematocrit 28.0 (L) 37.0 - 48.5 %    MCV 98 82 - 98 fL    MCH 30.4 27.0 - 31.0 pg    MCHC 31.1 (L) 32.0 - 36.0 g/dL    RDW 15.4 (H) 11.5 - 14.5 %    Platelets 332 150 - 350 K/uL    MPV 10.1 9.2 - 12.9 fL    Immature Granulocytes 0.9 (H) 0.0 - 0.5 %    Gran # (ANC) 5.3 1.8 - 7.7 K/uL    Immature Grans (Abs) 0.08 (H) 0.00 - 0.04 K/uL    Lymph # 2.1 1.0 - 4.8 K/uL    Mono # 0.8 0.3 - 1.0 K/uL    Eos # 0.2 0.0 - 0.5 K/uL    Baso # 0.01 0.00 - 0.20 K/uL    nRBC 0 0 /100 WBC    Gran% 62.7 38.0 - 73.0 %    Lymph% 25.3 18.0 - 48.0 %    Mono% 9.1 4.0 - 15.0 %    Eosinophil% 1.9 0.0 -  8.0 %    Basophil% 0.1 0.0 - 1.9 %    Differential Method Automated    Comprehensive metabolic panel    Collection Time: 03/11/19  5:12 AM   Result Value Ref Range    Sodium 134 (L) 136 - 145 mmol/L    Potassium 4.0 3.5 - 5.1 mmol/L    Chloride 102 95 - 110 mmol/L    CO2 26 23 - 29 mmol/L    Glucose 91 70 - 110 mg/dL    BUN, Bld 12 6 - 20 mg/dL    Creatinine 0.5 0.5 - 1.4 mg/dL    Calcium 7.9 (L) 8.7 - 10.5 mg/dL    Total Protein 6.3 6.0 - 8.4 g/dL    Albumin 1.8 (L) 3.5 - 5.2 g/dL    Total Bilirubin 0.1 0.1 - 1.0 mg/dL    Alkaline Phosphatase 89 55 - 135 U/L    AST 97 (H) 10 - 40 U/L    ALT 52 (H) 10 - 44 U/L    Anion Gap 6 (L) 8 - 16 mmol/L    eGFR if African American >60.0 >60 mL/min/1.73 m^2    eGFR if non African American >60.0 >60 mL/min/1.73 m^2   Magnesium    Collection Time: 03/11/19  5:12 AM   Result Value Ref Range    Magnesium 1.2 (L) 1.6 - 2.6 mg/dL   Phosphorus    Collection Time: 03/11/19  5:12 AM   Result Value Ref Range    Phosphorus 3.0 2.7 - 4.5 mg/dL   Hepatitis B core antibody, total    Collection Time: 03/11/19 11:13 AM   Result Value Ref Range    Hep B Core Total Ab Negative    Hepatitis B surface antigen    Collection Time: 03/11/19 11:13 AM   Result Value Ref Range    Hepatitis B Surface Ag Negative    CK    Collection Time: 03/11/19 11:13 AM   Result Value Ref Range     (H) 20 - 180 U/L       No results for input(s): POCTGLUCOSE in the last 168 hours.    No results found for: HGBA1C     Active Hospital Problems    Diagnosis  POA    *Pericardial tamponade [I31.4]  Yes    Seizure disorder [G40.909]  Unknown    Anemia [D64.9]  Unknown    Pericardial effusion [I31.3]  Yes    ELVER positive [R76.8]  No      Resolved Hospital Problems   No resolved problems to display.         enoxaparin  40 mg Subcutaneous Daily    eslicarbazepine  1,600 mg Oral QHS    predniSONE  60 mg Oral Daily     acetaminophen, morphine, ondansetron, ondansetron, sodium chloride 0.9%    MRI brain  Impression        No acute intracranial abnormality identified on today's examination.  Specifically, no evidence of acute infarction or enhancing lesion.         Assessment and Plan for problems addressed today:   1. Pericardial tamponade  - noted tamponade s/p pericardial drain  - high consideration for connective tissue disease/autoimmune disease as cause   - overall, labs suspicious for SLE  - hold off antibiotics given no growth per OSH  - rheumatology consulted, appreciate recommendations and assistance. On IV steroids per their recommendation, transitioned to PO prednisone 60mg  - cards following  - pericardial drain removed 3/8/19  - cards recommending limited STEFFI in 2-3 days, repeat showed trivial pericardial effusion but does note large pleural effusion on the left   - will d/w pulm consults    2. Seizure disorder  - known previous history, states taking medication since her childhood  - previously on aptiom and dilantin  - dilantin discontinued given risk for SLE, started on keppra but discontinued  - currently on home aptiom per neuro recs  - some confusion noted in ICU, MRI brain performed  - Neurology consulted, appreciate recs  - MRI brain nonacute     3. ELVER positive  - + ELVER in setting of pericarditis and pleural effusion  - Highly suspicious for SLE  - Appreciate rheumatology recs  - left knee aspirated per rheum, c/w inflammatory arthritis  - CPK elevated  - checking arthritis Xrays     4. Anemia  - noted anemia with MCV of 97  - hemodynamically stable  - no evidence of acute blood loss  - transfuse < 7 as needed   - daily CBCs    5. Hypomagnesemia  - replace  - BMP in a.m.    6. Hypokalemia, resolved  - BMP in a.m.    7. Acute encephalopathy, resolved  - MRI brain nonacute    8. Proteinuria  - possible renal involvement given blood/protein on outside UA  - repeat UA- no protein   - obtain 24hr urine protein   - may end up needing renal biopsy        Diet: regular  DVT PPx: lovenox  Code status:  FULL    Discharge plan and follow up:  Follow up Rheumatology recs, repeat STEFFI showed improvement in pericardial effusion but does know a new left sided pleural effusion, will discuss with pulmonology, appreciate neuro input in seizure medication management, patient needing some social assistance with resources that she is on disability, cm/social work assisting with this      Hui Ferguson MD  [facility] Medicine Staff  [phone] pager

## 2019-03-11 NOTE — PLAN OF CARE
Problem: Adult Inpatient Plan of Care  Goal: Plan of Care Review  Outcome: Revised  Pt received bath. Pt remains free from injury/falls for shift. Educated Pt on meds no questions at this time. VSS.  No complaints of CP, SOB, pain/discomfort  Bed locked in lowest position, call bell within reach. All questions addressed.  Will continue to monitor.

## 2019-03-11 NOTE — SUBJECTIVE & OBJECTIVE
Interval History: no acute overnight events. No chest pain, no sob, pt is walking to bathroom and was sitting in chair earlier today. No new rashes, myalgias, arthralgias, no oral or nasal ulcerations.     Current Facility-Administered Medications   Medication Frequency    acetaminophen tablet 650 mg Q4H PRN    enoxaparin injection 40 mg Daily    eslicarbazepine Tab 1,600 mg QHS    morphine injection 2 mg On Call Procedure    ondansetron disintegrating tablet 8 mg Q8H PRN    ondansetron injection 4 mg Q8H PRN    predniSONE tablet 60 mg Daily    sodium chloride 0.9% flush 3 mL PRN     Objective:     Vital Signs (Most Recent):  Temp: 98.4 °F (36.9 °C) (03/11/19 0810)  Pulse: 96 (03/11/19 1044)  Resp: 18 (03/11/19 0810)  BP: 112/63 (03/11/19 0810)  SpO2: 95 % (03/11/19 0810)  O2 Device (Oxygen Therapy): room air (03/10/19 1931) Vital Signs (24h Range):  Temp:  [97.4 °F (36.3 °C)-98.4 °F (36.9 °C)] 98.4 °F (36.9 °C)  Pulse:  [] 96  Resp:  [18] 18  SpO2:  [94 %-97 %] 95 %  BP: ()/(58-78) 112/63     Weight: 55.4 kg (122 lb 2.2 oz) (03/11/19 0800)  Body mass index is 20.32 kg/m².  Body surface area is 1.59 meters squared.      Intake/Output Summary (Last 24 hours) at 3/11/2019 1058  Last data filed at 3/11/2019 1035  Gross per 24 hour   Intake 720 ml   Output 1250 ml   Net -530 ml       Physical Exam   Nursing note reviewed.  Constitutional: She is oriented to person, place, and time and well-developed, well-nourished, and in no distress. No distress.   HENT:   Head: Normocephalic and atraumatic.   Right Ear: External ear normal.   Left Ear: External ear normal.   Mouth/Throat: Oropharynx is clear and moist. No oropharyngeal exudate.   Eyes: Conjunctivae are normal. Right eye exhibits no discharge. Left eye exhibits no discharge. No scleral icterus.   Neck: Normal range of motion. Neck supple.   Cardiovascular: Normal rate, regular rhythm and normal heart sounds.    No murmur heard.  Pulmonary/Chest:  Effort normal. She has no wheezes.   Decreased breath sounds in bases    Abdominal: Soft. Bowel sounds are normal. She exhibits no distension.   Lymphadenopathy:     She has no cervical adenopathy.   Neurological: She is alert and oriented to person, place, and time.   Skin: Skin is warm and dry. Rash noted. She is not diaphoretic.     Hyperpigmented areas on LE b/l    Psychiatric: Mood and affect normal.   Musculoskeletal: Normal range of motion. She exhibits no edema.   No effusions, enthesitis, dactylitis or synovitis appreciated on exam         Significant Labs:  BMP:   Recent Labs   Lab 03/11/19  0512   GLU 91   *   K 4.0      CO2 26   BUN 12   CREATININE 0.5   CALCIUM 7.9*   MG 1.2*     CBC:   Recent Labs   Lab 03/11/19 0512   WBC 8.45   HGB 8.7*   HCT 28.0*        CMP:   Recent Labs   Lab 03/11/19 0512   GLU 91   CALCIUM 7.9*   ALBUMIN 1.8*   PROT 6.3   *   K 4.0   CO2 26      BUN 12   CREATININE 0.5   ALKPHOS 89   ALT 52*   AST 97*   BILITOT 0.1       Significant Imaging:  Imaging results within the past 24 hours have been reviewed.

## 2019-03-11 NOTE — ASSESSMENT & PLAN NOTE
Pinky Lanier is a 29 year old female with past medical history of seizure disorder (since the age of 1 mos) who presents as a transfer from [facility] in [place] for further evaluation for pericardial effusion and pleural effusions.      Patient was recently admitted to [facility] on 2/25/2019 for acute chest pain.She was diagnosed with acute, likely viral pericarditis and was noted to have moderate pericardial effusion without tamponade physiology.  She was started on colchicine 0.6 mg PO BID and ibuprofen 600mg tid .  She was subsequently discharged on 2/27 in good condition, however she had noticed worsening severe chest pain that was associated with light headedness, dizziness, and dyspnea.    She then presented to [facility] on 3/3 where she was noted to have hypotension with a BP of 90/60.  There, a CT chest revealed a large pericardial effusion as well as bilateral pleural effusions, R > L.  She was started on a levophed gtt for BP management.  Subsequent 2D echo revealed a large effusion with a maximum thickness of 3.2 cm without evidence of collapse of the R ventricle.  She underwent subsequent pericardial drain placement 3/4/19 with initial drain output of 500 ccs per shift.  This eventually improved to approximately 50 ccs within the shift prior to transfer.  Fluid analysis noted 13k WBCs, low pH, and glucose in the 60s; gram stain was negative.  Other labs at the time revealed hypoalbuminemia and hypocomplementemia, and positve ELVER. where there was concern that the underlying effusions were secondary to lupus.  She was initially started on prednisone 30mg and then increased to methylpred 125 mg IV daily.   Neurology was also who performed an EEG which showed cortical irratlibility from the left frontal central parietal region as well as mild encephalopathy, no seizure activity.  She was started on keppra, continued on eslicarbazepine, and discontinued on phenytoin.      She was  transferred to [facility] on 3/7/2019.  Here she was initially somnolent on initial consult day but then later awake and alert. She denies any chest pain, shortness of breath, rashes, joint pain or swelling, no raynads, oral or nasal ulcers.   No family history of SLE other autoimmune conditions       Labs showing transaminitis, elevated CPK/aldolase, albumin 1.8, sed rate 56, CRP 43.5. +ELVER 1:2560 C3 70 and C4 15, RAF negative. +antihistone ab, +dsDNA.  Blood cx NGTD, TSH 1.9, FT4 0.49, lupus anticoagulant negative, cardiolipin IgM 20.9, beta 2 glycoprotein IgA 23, left knee aspirate showing wbc 2,100 (83% lymphocytes), no crystals culture NGTD, UA negative UPC 0.37   Imaging showing CXR showing b/l pleural effusions and cardiomegaly. CT head with no acute abnormalities. MRI brain normal.       Problem list:  Pericarditis with pericardial effusion s/p pericardial drain placement (drain removed 3/8)  Pleural effusions b/l, left > right  transaminitis   Raynauds   +ELVER 2560 (history of low complements although here normal), +dsDNA, +antihistone Ab  +sm/RNP  Elevated CPK , could be lupus myositis vs from seizures    Patient meets SLICC criteria for SLE (arthritis, Serositis, +ELVER, +SM/RNP, low complements in past, +dsDNA, +antihistone ab).     Treatments thus far:  Solumedrol 125mg IV 3/4- 3/8  Prednisone 60mg started 3/9 to present    PLAN:  -recommend changing prednisone 60mg daily to prednisone 30mg BID.  -recommend starting PPI for GI ppx while on high dose steroids  -appreciate neurology recs   -continue to trend LFTs, CPK and aldolase daily  -repeat echocardiogram showing large left pleural effusion, recommend pulmonary consult to comment on pleural effusion and if need for thoracocentesis.  -obtain myomarker panel, Scl70, RNA polymerase III, quantiferon gold, hepatitis B core ab and hepatitis b surface antigen.  -patient with positive beta 2 glycoprotein and cardiolipin, will need repeat in 12 weeks   -left knee  aspirated 3/8: fluid consistent with inflammatory arthritis; but arthritis survey without erosions.  -she has low albumin 1.7, please consult nutrition    -pt to follow up with rheumatology closer to home as she can not drive 2/2 epilepsy.  Dr José Luis Becker rheumatology is located in [facility]

## 2019-03-11 NOTE — PLAN OF CARE
PASCUAL spoke with pt's mom Teodoro regarding additional resources for pt at home. Mom states that she will follow up with PASCUAL's recommendations.     Vibha Lima LMSW  [facility]

## 2019-03-12 ENCOUNTER — TELEPHONE (OUTPATIENT)
Dept: RHEUMATOLOGY | Facility: CLINIC | Age: 29
End: 2019-03-12

## 2019-03-12 DIAGNOSIS — R74.8 ELEVATED CPK: ICD-10-CM

## 2019-03-12 DIAGNOSIS — L93.0 LUPUS ERYTHEMATOSUS, UNSPECIFIED FORM: Primary | ICD-10-CM

## 2019-03-12 PROBLEM — R80.9 PROTEINURIA: Status: ACTIVE | Noted: 2019-03-12

## 2019-03-12 LAB
ALBUMIN SERPL BCP-MCNC: 2.1 G/DL
ALP SERPL-CCNC: 91 U/L
ALT SERPL W/O P-5'-P-CCNC: 55 U/L
ANION GAP SERPL CALC-SCNC: 8 MMOL/L
AST SERPL-CCNC: 103 U/L
BACTERIA BLD CULT: NORMAL
BILIRUB SERPL-MCNC: 0.1 MG/DL
BUN SERPL-MCNC: 11 MG/DL
CALCIUM SERPL-MCNC: 8.4 MG/DL
CHLORIDE SERPL-SCNC: 100 MMOL/L
CK SERPL-CCNC: 811 U/L
CO2 SERPL-SCNC: 29 MMOL/L
CREAT SERPL-MCNC: 0.5 MG/DL
ENA SCL70 AB SER-ACNC: 4 UNITS
EST. GFR  (AFRICAN AMERICAN): >60 ML/MIN/1.73 M^2
EST. GFR  (NON AFRICAN AMERICAN): >60 ML/MIN/1.73 M^2
GLUCOSE SERPL-MCNC: 81 MG/DL
POTASSIUM SERPL-SCNC: 3.8 MMOL/L
PROT SERPL-MCNC: 7.3 G/DL
SODIUM SERPL-SCNC: 137 MMOL/L

## 2019-03-12 PROCEDURE — 99232 PR SUBSEQUENT HOSPITAL CARE,LEVL II: ICD-10-PCS | Mod: GC,,, | Performed by: INTERNAL MEDICINE

## 2019-03-12 PROCEDURE — 63600175 PHARM REV CODE 636 W HCPCS: Performed by: INTERNAL MEDICINE

## 2019-03-12 PROCEDURE — 63600175 PHARM REV CODE 636 W HCPCS

## 2019-03-12 PROCEDURE — 97165 OT EVAL LOW COMPLEX 30 MIN: CPT

## 2019-03-12 PROCEDURE — 82550 ASSAY OF CK (CPK): CPT

## 2019-03-12 PROCEDURE — 99232 SBSQ HOSP IP/OBS MODERATE 35: CPT | Mod: ,,, | Performed by: NURSE PRACTITIONER

## 2019-03-12 PROCEDURE — 20600001 HC STEP DOWN PRIVATE ROOM

## 2019-03-12 PROCEDURE — 36415 COLL VENOUS BLD VENIPUNCTURE: CPT

## 2019-03-12 PROCEDURE — 80053 COMPREHEN METABOLIC PANEL: CPT

## 2019-03-12 PROCEDURE — 25000003 PHARM REV CODE 250: Performed by: PSYCHIATRY & NEUROLOGY

## 2019-03-12 PROCEDURE — 25000003 PHARM REV CODE 250: Performed by: NURSE PRACTITIONER

## 2019-03-12 PROCEDURE — 97161 PT EVAL LOW COMPLEX 20 MIN: CPT

## 2019-03-12 PROCEDURE — 99232 PR SUBSEQUENT HOSPITAL CARE,LEVL II: ICD-10-PCS | Mod: ,,, | Performed by: NURSE PRACTITIONER

## 2019-03-12 PROCEDURE — 99232 SBSQ HOSP IP/OBS MODERATE 35: CPT | Mod: GC,,, | Performed by: INTERNAL MEDICINE

## 2019-03-12 PROCEDURE — 82085 ASSAY OF ALDOLASE: CPT

## 2019-03-12 RX ORDER — PANTOPRAZOLE SODIUM 40 MG/1
40 TABLET, DELAYED RELEASE ORAL DAILY
Status: DISCONTINUED | OUTPATIENT
Start: 2019-03-13 | End: 2019-03-13 | Stop reason: HOSPADM

## 2019-03-12 RX ORDER — HYDROXYCHLOROQUINE SULFATE 200 MG/1
200 TABLET, FILM COATED ORAL 2 TIMES DAILY
Status: DISCONTINUED | OUTPATIENT
Start: 2019-03-12 | End: 2019-03-13 | Stop reason: HOSPADM

## 2019-03-12 RX ADMIN — ESLICARBAZEPINE ACETATE 1600 MG: 400 TABLET ORAL at 09:03

## 2019-03-12 RX ADMIN — HYDROXYCHLOROQUINE SULFATE 200 MG: 200 TABLET, FILM COATED ORAL at 09:03

## 2019-03-12 RX ADMIN — PREDNISONE 30 MG: 20 TABLET ORAL at 09:03

## 2019-03-12 RX ADMIN — ENOXAPARIN SODIUM 40 MG: 100 INJECTION SUBCUTANEOUS at 05:03

## 2019-03-12 NOTE — PLAN OF CARE
Problem: Adult Inpatient Plan of Care  Goal: Plan of Care Review  Pt remained free of injuries, falls, and trauma. VSS. No complaints of pain or SOB mentioned. Fall precautions maintained. AVASYS camera at bedside.  Thoracentesis not done. Risks outweighs the benefits.Plan of care reviewed with pt. Pt verbalized understanding. All questions and concerns addressed. No complaints or distress noted. Will continue to monitor.

## 2019-03-12 NOTE — PROGRESS NOTES
[facility]  Rheumatology  Progress Note    Patient Name: Pinky Lanier  MRN: [id number]  Admission Date: 3/7/2019  Hospital Length of Stay: 5 days  Code Status: Full Code   Attending Provider: PAMELA Thapa MD  Primary Care Physician: Primary Doctor No  Principal Problem: Pericardial tamponade    Subjective:     HPI: Pinky Lanier is a 29 year old female with past medical history of seizure disorder (since the age of 1 mos) who presents as a transfer from [facility] in [place] for further evaluation for pericardial effusion and pleural effusions.     Patient was recently admitted to [facility] on 2/25/2019 for acute chest pain.She was diagnosed with acute, likely viral pericarditis and was noted to have moderate pericardial effusion without tamponade physiology.  She was started on colchicine 0.6 mg PO BID and ibuprofen 600mg tid .  She was subsequently discharged on 2/27 in good condition, however she had noticed worsening severe chest pain that was associated with light headedness, dizziness, and dyspnea.    She then presented to [facility] on 3/3 where she was noted to have hypotension with a BP of 90/60.  There, a CT chest revealed a large pericardial effusion as well as bilateral pleural effusions, R > L.  She was started on a levophed gtt for BP management.  Subsequent 2D echo revealed a large effusion with a maximum thickness of 3.2 cm without evidence of collapse of the R ventricle.  She underwent subsequent pericardial drain placement 3/4/19 with initial drain output of 500 ccs per shift.  This eventually improved to approximately 50 ccs within the shift prior to transfer.  Fluid analysis noted 13k WBCs, low pH, and glucose in the 60s; gram stain was negative.  Other labs at the time revealed hypoalbuminemia and hypocomplementemia, and positve ELVER. where there was concern that the underlying effusions were secondary to lupus.  She was initially started on prednisone  30mg and then increased to methylpred 125 mg IV daily.   Neurology was also who performed an EEG which showed cortical irratlibility from the left frontal central parietal region as well as mild encephalopathy, no seizure activity.  She was started on keppra, continued on eslicarbazepine, and discontinued on phenytoin.      She was transferred to [facility] on 3/7/2019.  Here she was intimally somnolent but then later awake and alert. She denies any chest pain, shortness of breath, rashes, joint pain or swelling, no raynads, oral or nasal ulcers.   No family history of SLE other autoimmune conditions           Interval History: no acute overnight events. No chest pain, no sob, pt is walking to bathroom and was sitting in chair earlier today. No new rashes, myalgias, arthralgias, no oral or nasal ulcerations.     Current Facility-Administered Medications   Medication Frequency    acetaminophen tablet 650 mg Q4H PRN    enoxaparin injection 40 mg Daily    eslicarbazepine Tab 1,600 mg QHS    lidocaine (PF) 10 mg/ml (1%) injection 100 mg Once    morphine injection 2 mg On Call Procedure    ondansetron disintegrating tablet 8 mg Q8H PRN    ondansetron injection 4 mg Q8H PRN    predniSONE tablet 30 mg BID    sodium chloride 0.9% flush 3 mL PRN     Objective:     Vital Signs (Most Recent):  Temp: 98.2 °F (36.8 °C) (03/12/19 0818)  Pulse: 97 (03/12/19 1057)  Resp: 18 (03/12/19 0818)  BP: 120/66 (03/12/19 0818)  SpO2: (!) 94 % (03/12/19 0818)  O2 Device (Oxygen Therapy): room air (03/12/19 0438) Vital Signs (24h Range):  Temp:  [97.7 °F (36.5 °C)-98.6 °F (37 °C)] 98.2 °F (36.8 °C)  Pulse:  [] 97  Resp:  [14-21] 18  SpO2:  [94 %-98 %] 94 %  BP: (103-120)/(55-73) 120/66     Weight: 48.1 kg (106 lb 0.7 oz) (03/12/19 0700)  Body mass index is 17.65 kg/m².  Body surface area is 1.49 meters squared.      Intake/Output Summary (Last 24 hours) at 3/12/2019 1214  Last data filed at 3/12/2019 0600  Gross per 24 hour   Intake  1197 ml   Output 1300 ml   Net -103 ml       Physical Exam   Vitals reviewed.  Constitutional: She is oriented to person, place, and time and well-developed, well-nourished, and in no distress. No distress.   HENT:   Head: Normocephalic and atraumatic.   Right Ear: External ear normal.   Left Ear: External ear normal.   Mouth/Throat: Oropharynx is clear and moist. No oropharyngeal exudate.   Eyes: Conjunctivae are normal. Right eye exhibits no discharge. Left eye exhibits no discharge. No scleral icterus.   Neck: Normal range of motion. Neck supple.   Cardiovascular: Normal rate, regular rhythm and normal heart sounds.    No murmur heard.  Pulmonary/Chest: Effort normal. She has no wheezes.   Decreased breath sounds in bases    Abdominal: Soft. Bowel sounds are normal. She exhibits no distension.   Lymphadenopathy:     She has no cervical adenopathy.   Neurological: She is alert and oriented to person, place, and time.   Skin: Skin is warm and dry. Rash noted. She is not diaphoretic.     Hyperpigmented areas on LE b/l    Psychiatric: Mood and affect normal.   Musculoskeletal: Normal range of motion. She exhibits no edema.   No effusions, enthesitis, dactylitis or synovitis appreciated on exam    5/5 strength upper and lower extremities         Significant Labs:  BMP:   Recent Labs   Lab 03/12/19  1025   GLU 81      K 3.8      CO2 29   BUN 11   CREATININE 0.5   CALCIUM 8.4*     CBC:   No results for input(s): WBC, HGB, HCT, PLT in the last 24 hours.  CMP:   Recent Labs   Lab 03/12/19  1025   GLU 81   CALCIUM 8.4*   ALBUMIN 2.1*   PROT 7.3      K 3.8   CO2 29      BUN 11   CREATININE 0.5   ALKPHOS 91   ALT 55*   *   BILITOT 0.1       Significant Imaging:  Imaging results within the past 24 hours have been reviewed.    Assessment/Plan:     ELVER positive    Pinky Lanier is a 29 year old female with past medical history of seizure disorder (since the age of 1 mos) who presents as a transfer  from [facility] in [place] for further evaluation for pericardial effusion and pleural effusions.      Patient was recently admitted to [facility] on 2/25/2019 for acute chest pain.She was diagnosed with acute, likely viral pericarditis and was noted to have moderate pericardial effusion without tamponade physiology.  She was started on colchicine 0.6 mg PO BID and ibuprofen 600mg tid .  She was subsequently discharged on 2/27 in good condition, however she had noticed worsening severe chest pain that was associated with light headedness, dizziness, and dyspnea.    She then presented to [facility] on 3/3 where she was noted to have hypotension with a BP of 90/60.  There, a CT chest revealed a large pericardial effusion as well as bilateral pleural effusions, R > L.  She was started on a levophed gtt for BP management.  Subsequent 2D echo revealed a large effusion with a maximum thickness of 3.2 cm without evidence of collapse of the R ventricle.  She underwent subsequent pericardial drain placement 3/4/19 with initial drain output of 500 ccs per shift.  This eventually improved to approximately 50 ccs within the shift prior to transfer.  Fluid analysis noted 13k WBCs, low pH, and glucose in the 60s; gram stain was negative.  Other labs at the time revealed hypoalbuminemia and hypocomplementemia, and positve ELVER. where there was concern that the underlying effusions were secondary to lupus.  She was initially started on prednisone 30mg and then increased to methylpred 125 mg IV daily.   Neurology was also who performed an EEG which showed cortical irratlibility from the left frontal central parietal region as well as mild encephalopathy, no seizure activity.  She was started on keppra, continued on eslicarbazepine, and discontinued on phenytoin.      She was transferred to [facility] on 3/7/2019.  Here she was initially somnolent on initial consult day but then later awake and alert. She denies any chest  pain, shortness of breath, rashes, joint pain or swelling, no raynads, oral or nasal ulcers.   No family history of SLE other autoimmune conditions       Labs showing transaminitis, elevated CPK/aldolase, albumin 1.8, sed rate 56, CRP 43.5. +ELVER 1:2560 C3 70 and C4 15, RAF negative. +antihistone ab, +dsDNA.  Blood cx NGTD, TSH 1.9, FT4 0.49, lupus anticoagulant negative, cardiolipin IgM 20.9, beta 2 glycoprotein IgA 23, left knee aspirate showing wbc 2,100 (83% lymphocytes), no crystals culture NGTD, UA negative UPC 0.37   Imaging showing CXR showing b/l pleural effusions and cardiomegaly. CT head with no acute abnormalities. MRI brain normal.       Problem list:  Pericarditis with pericardial effusion s/p pericardial drain placement (drain removed 3/8)  Pleural effusions b/l, left > right  transaminitis   Raynauds   +ELVER 2560 (history of low complements although here normal), +dsDNA, +antihistone Ab  +sm/RNP  Elevated CPK , could be lupus myositis vs overlap syndrome)    Patient meets SLICC criteria for SLE (arthritis, Serositis, +ELVER, +SM/RNP, low complements in past, +dsDNA, +antihistone ab).     Treatments thus far:  Solumedrol 125mg IV 3/4- 3/8  Prednisone 60mg started 3/9 to 3/11  Prednisone 30mg BID 3/12 to present    PLAN:  -upon discharge please change prednisone 30mg BID to prednisone 40mg daily.  -recommend starting plaquenil 200mg BID  -recommend starting PPI for GI ppx while on high dose steroids and please continue on discharge.  -appreciate neurology recs   -continue to trend LFTs, CPK and aldolase daily  -appreciate pulmonary recs on b/l pleural effusions, bedside lung ultrasound showing small pleural effusions, no thoracocentesis recommended at this time.  -follow up myomarker panel, Scl70, RNA polymerase III, quantiferon gold  -patient with positive beta 2 glycoprotein and cardiolipin, will need repeat in 12 weeks   -left knee aspirated 3/8: fluid consistent with inflammatory arthritis; but  arthritis survey without erosions.  -pt to follow up with rheumatology closer to home as she can not drive 2/2 epilepsy.  Pt has appt scheduled 3/22 with  Dr Payne in [place].               Liset Templeton, DO  Rheumatology  [facility]        RHEUMATOLOGY ATTENDING:  Patient presented, personally interviewed and examined, medical records reviewed.  29 yr old lady w seizure disorder since infancy transferred from [facility] for pericardial & pleural effusions requiring drain. Had some joint pain with swelling in hands and knees and discoloration of hands c/w Raynaud's. Also some hyperpigmentation of hands reported. Started on steroids and w/u revealed +ELVER 1:2560 sp with +Sm & RNP;  + ds DNA 1:80. Anti histone 5.4 (0.9); C3 & C4 ok at 70 & 15 but apparently were low at [facility]. Has mildly elevated IgM  aCLA but LAC neg; RAF neg;  CPK elevated x 4 at 909, 758, 728 & 858 with elevated aldolase of 19.7; ESR 56; CBC w mild anemia; & mild thrombocytosis; CMP with alb. 1.7; ; ALT 59; Na 135;   Results of aspiration of L knee c/w SLE arthritis w WBC of 2100 mostly lymphs.   CXR yesterday with mild left pleural effusion  Currently on prednisone 60 mg po wo adverse effects.  Today has no complaints and with our help has gotten a Rheumatology appointment in [place] for f/u.  We will add  mg/d. Side effects and toxicities reviewed; handout provided.  We will decrease prednisone dose, however, due to anti epileptic drug use the serum concentration of steroids may be further decreased and response needs to be followed, as higher doses than usual may need to be used.   Findings discussed with patient & Dr. Templeton whose note and assessment I agree gloria

## 2019-03-12 NOTE — TELEPHONE ENCOUNTER
Spoke with Rheumatology office in [place], [place].  Will fax external referral and most recent note to [phone].  Per office patient will then be called to schedule an appointment, she should be able to see a rheumatologist in a few weeks.

## 2019-03-12 NOTE — ASSESSMENT & PLAN NOTE
Minimal bilateral pleural effusions due to serositis 2/2 SLE.   -Minimal effusions likely represent residual fluid from inflammatory process, but appear to be small enough that the risk of thoracentesis outweighs the potential benefit. No infectious symptoms to raise concern for an uncontrolled source of infection.  -Discussed with the patient and her mother at bedside.  -Agree with ongoing immunosuppression for her SLE

## 2019-03-12 NOTE — HPI
Ms. Lanier is a 28 y/o woman with recent pericardial effusions and bilateral pleural effusions felt to be due to newly diagnosed SLE. She has undergone pericardiocentesis due to tamponade physiology, but has responded to high-dose steroids and has had resolution of her effusion. Her pleural effusions have been small while being monitored in the ICU. She denies dyspnea or change in her breathing and feels like she is ready to go home soon.

## 2019-03-12 NOTE — PT/OT/SLP EVAL
Occupational Therapy   Co-Evaluation and Discharge Note    Name: Pinky Lanier  MRN: [id number]  Admitting Diagnosis:  Pericardial tamponade      Recommendations:     Discharge Recommendations: (home no OT needs)  Discharge Equipment Recommendations:  none  Barriers to discharge:  None    Assessment:     Pinky Lanier is a 29 y.o. female with a medical diagnosis of Pericardial tamponade. Pt admitted 3/7 for a pericardial tamponade and suspected SLE. Pt found to have SLE with negative EEG. At this time, patient is functioning at their prior level of function and does not require further acute OT services.     Plan:     During this hospitalization, patient does not require further acute OT services.  Please re-consult if situation changes.    · Plan of Care Reviewed with: patient    Subjective     Chief Complaint: none reported   Patient/Family Comments/goals: return home and to PLOF    Occupational Profile:  Living Environment: Pt lives with boyfriend in a 1st floor apartment with 1STE. Bathroom has a tub/shower combo.   Previous level of function: PTA, pt was independent with functional mobility and ADLs. She was working 4 hours/day as a  for an office building. She does not drive.   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   Equipment Used at home:  none  Assistance upon Discharge: Pt's boyfriend and his mother is able to assist.     Pain/Comfort:  · Pain Rating 1: 0/10  · Pain Rating Post-Intervention 1: 0/10    Patients cultural, spiritual, Sikhism conflicts given the current situation: no    Objective:     Communicated with: RN prior to session.  Patient found supine with telemetry upon OT entry to room.    General Precautions: Standard, fall   Orthopedic Precautions:N/A   Braces: N/A     Occupational Performance:    Bed Mobility:   · Supine>Sit: independence    · HOB elevated   · Scooting to EOB: independence     · Sit>Supine: independence     · Scooting to HOB:  independence       Transfers:   · Sit<>Stand Transfer: independence from EOB/to chair without any AD       Functional Mobility: Pt engaging in functional mobility to simulate community distances approx 400ft with supervision and utilizing no AD in order to assess functional activity tolerance and standing balance required for engagement in occupations of choice.   · No instability or LOB required      Activities of Daily Living:  · LB Dressing: supervision    · Via anterior trunk flexion to access BLE's   · supervision for standing balance     Cognitive/Visual Perceptual:  · Pt alert and oriented x4  · Pt with good insight into condition and with good motivation to engage in therapy session  · Pt able to follow multi-step commands 100% of the time  · Effective verbal communication    · Intact short and long term memory  · Intact safety awareness  · No visual deficits present    Physical Exam:  Balance:  · Sitting: independent   · At EOB without any UE support    · Standing: supervision  Postural Examination: no deviations noted   Skin Integrity: intact   Edema: none noted   Sensation: intact to light touch in BUE's  UE ROM:  · Right: WFL  · Left: WFL  UE Strength:  · Right: impaired 4/5  · Left: impaired 4/5   Strength:  · Right: WFL  · Left: WFL  Fine Motor Control: WFL b/l finger opposition   Gross Motor Control: WFL    Therapeutic Intervention & Education:  · Communicated OT POC  · Updated communication board with level of assist required (supervision) & educated RN/patient that pt is appropriate for OOB in the hallway with RN/PCT   · Educated on importance of EOB/OOB mobility, sitting up in chair, and maximizing independence with ADLs during admission   · No family present during session    AMPAC 6 Click ADL:  AMPAC Total Score: 24  Education:    Patient left up in chair with all lines intact, call button in reach, RN notified and telesitter present    GOALS:   Multidisciplinary Problems     Occupational  Therapy Goals     Not on file          Multidisciplinary Problems (Resolved)        Problem: Occupational Therapy Goal    Goal Priority Disciplines Outcome Interventions   Occupational Therapy Goal   (Resolved)     OT, PT/OT Outcome(s) achieved                    History:     Past Medical History:   Diagnosis Date    Seizures        Past Surgical History:   Procedure Laterality Date    TONSILLECTOMY         Time Tracking:     OT Date of Treatment: 03/12/19  OT Start Time: 0849  OT Stop Time: 0858  OT Total Time (min): 9 min    Billable Minutes:Evaluation 9 minutes    Leena Ku OT  3/12/2019

## 2019-03-12 NOTE — CONSULTS
[facility]  Pulmonology  Consult Note    Patient Name: Pinky Lanier  MRN: [id number]  Admission Date: 3/7/2019  Hospital Length of Stay: 4 days  Code Status: Full Code  Attending Physician: Hui Ferguson MD  Primary Care Provider: Primary Doctor No   Principal Problem: Pericardial tamponade    Inpatient consult to Pulmonology  Consult performed by: Arina Mccrary DO  Consult ordered by: Hui Ferguson MD        Subjective:     HPI:  Ms. Lanier is a 28 y/o woman with recent pericardial effusions and bilateral pleural effusions felt to be due to newly diagnosed SLE. She has undergone pericardiocentesis due to tamponade physiology, but has responded to high-dose steroids and has had resolution of her effusion. Her pleural effusions have been small while being monitored in the ICU. She denies dyspnea or change in her breathing and feels like she is ready to go home soon.    Past Medical History:   Diagnosis Date    Seizures        Past Surgical History:   Procedure Laterality Date    TONSILLECTOMY         Review of patient's allergies indicates:  No Known Allergies    Family History     None        Tobacco Use    Smoking status: Never Smoker   Substance and Sexual Activity    Alcohol use: No     Frequency: Never    Drug use: No    Sexual activity: Yes         Review of Systems   Constitutional: Negative for activity change, chills and fever.   HENT: Negative for rhinorrhea.    Respiratory: Negative for cough and shortness of breath.    Cardiovascular: Negative for chest pain and leg swelling.   Gastrointestinal: Negative for constipation, diarrhea, nausea and vomiting.     Objective:     Vital Signs (Most Recent):  Temp: 98.9 °F (37.2 °C) (03/11/19 1210)  Pulse: (!) 112 (03/11/19 1900)  Resp: (!) 21 (03/11/19 1814)  BP: 117/73 (03/11/19 1704)  SpO2: 98 % (03/11/19 1814) Vital Signs (24h Range):  Temp:  [98 °F (36.7 °C)-98.9 °F (37.2 °C)] 98.9 °F (37.2 °C)  Pulse:  [] 112  Resp:   [18-21] 21  SpO2:  [94 %-99 %] 98 %  BP: ()/(58-73) 117/73     Weight: 55.4 kg (122 lb 2.2 oz)  Body mass index is 20.32 kg/m².      Intake/Output Summary (Last 24 hours) at 3/11/2019 1910  Last data filed at 3/11/2019 1400  Gross per 24 hour   Intake 1197 ml   Output 400 ml   Net 797 ml       Physical Exam   Constitutional: She appears well-developed and well-nourished.   HENT:   Head: Normocephalic and atraumatic.   Eyes: No scleral icterus.   Neck: Neck supple.   Cardiovascular: Normal rate and regular rhythm.   No murmur heard.  Pulmonary/Chest: Effort normal.   Speaking in full sentences, no tachypnea. CTAB   Abdominal: Soft. She exhibits no distension.   Musculoskeletal: She exhibits no edema or deformity.   Neurological: She is alert. Coordination normal.   Normal gait   Skin: Skin is warm and dry. No rash noted.   Vitals reviewed.    Bedside US demonstrates a small right-sided effusion and minimal left-sided effusion    O2: RA       Lines/Drains/Airways     Peripheral Intravenous Line                 Peripheral IV - Single Lumen 03/07/19 0600 Anterior;Left Hand 4 days                Significant Labs:    CBC/Anemia Profile:  Recent Labs   Lab 03/10/19  0545 03/11/19  0512   WBC 9.14 8.45   HGB 8.4* 8.7*   HCT 26.6* 28.0*    332   MCV 95 98   RDW 14.8* 15.4*        Chemistries:  Recent Labs   Lab 03/10/19  0545 03/11/19  0512    134*   K 4.0 4.0    102   CO2 30* 26   BUN 10 12   CREATININE 0.5 0.5   CALCIUM 8.2* 7.9*   ALBUMIN 1.8* 1.8*   PROT 6.3 6.3   BILITOT 0.1 0.1   ALKPHOS 82 89   ALT 49* 52*   AST 98* 97*   MG 1.2* 1.2*   PHOS 3.0 3.0       Blood Culture: NGTD  Pericardial fluid cultures: NGTD    All pertinent labs within the past 24 hours have been reviewed.      Significant Imaging:   CXR: I have reviewed all pertinent results/findings within the past 24 hours and my personal findings are:  right lateral decubitus film with an air fluid level in the stomach    Assessment/Plan:      Pleural effusion    Minimal bilateral pleural effusions due to serositis 2/2 SLE.   -Minimal effusions likely represent residual fluid from inflammatory process, but appear to be small enough that the risk of thoracentesis outweighs the potential benefit. No infectious symptoms to raise concern for an uncontrolled source of infection.  -Discussed with the patient and her mother at bedside.  -Agree with ongoing immunosuppression for her SLE           Thank you for your consult. I will sign off. Please contact us if you have any additional questions.     Arina Mccrary, DO  Pulmonology  [facility]

## 2019-03-12 NOTE — PLAN OF CARE
Problem: Physical Therapy Goal  Goal: Physical Therapy Goal  Outcome: Outcome(s) achieved Date Met: 03/12/19  Pt is independent with all mobility and ADLs. Pt does not require further acute skilled therapy intervention. Discharge from PT services and re-consult if pt experiences a change in status.

## 2019-03-12 NOTE — PLAN OF CARE
CM placed call Dr José Luis Becker, Rheumatology office (p. [phone]) to obtain pt's  2-3 week f/u appt. Clinic nurse request CM to fax pt's face sheet, referral, recent x-rays, and clinical notes to fax # [phone] prior to scheduling appt.    CM informed NP of need for referral to schedule pt's appt with Dr. José Luis Becker, Rheumatology, [place], [place].

## 2019-03-12 NOTE — ASSESSMENT & PLAN NOTE
Pinky Lanier is a 29 year old female with past medical history of seizure disorder (since the age of 1 mos) who presents as a transfer from [facility] in [place] for further evaluation for pericardial effusion and pleural effusions.      Patient was recently admitted to [facility] on 2/25/2019 for acute chest pain.She was diagnosed with acute, likely viral pericarditis and was noted to have moderate pericardial effusion without tamponade physiology.  She was started on colchicine 0.6 mg PO BID and ibuprofen 600mg tid .  She was subsequently discharged on 2/27 in good condition, however she had noticed worsening severe chest pain that was associated with light headedness, dizziness, and dyspnea.    She then presented to [facility] on 3/3 where she was noted to have hypotension with a BP of 90/60.  There, a CT chest revealed a large pericardial effusion as well as bilateral pleural effusions, R > L.  She was started on a levophed gtt for BP management.  Subsequent 2D echo revealed a large effusion with a maximum thickness of 3.2 cm without evidence of collapse of the R ventricle.  She underwent subsequent pericardial drain placement 3/4/19 with initial drain output of 500 ccs per shift.  This eventually improved to approximately 50 ccs within the shift prior to transfer.  Fluid analysis noted 13k WBCs, low pH, and glucose in the 60s; gram stain was negative.  Other labs at the time revealed hypoalbuminemia and hypocomplementemia, and positve ELVER. where there was concern that the underlying effusions were secondary to lupus.  She was initially started on prednisone 30mg and then increased to methylpred 125 mg IV daily.   Neurology was also who performed an EEG which showed cortical irratlibility from the left frontal central parietal region as well as mild encephalopathy, no seizure activity.  She was started on keppra, continued on eslicarbazepine, and discontinued on phenytoin.      She was  transferred to [facility] on 3/7/2019.  Here she was initially somnolent on initial consult day but then later awake and alert. She denies any chest pain, shortness of breath, rashes, joint pain or swelling, no raynads, oral or nasal ulcers.   No family history of SLE other autoimmune conditions       Labs showing transaminitis, elevated CPK/aldolase, albumin 1.8, sed rate 56, CRP 43.5. +ELVER 1:2560 C3 70 and C4 15, RAF negative. +antihistone ab, +dsDNA.  Blood cx NGTD, TSH 1.9, FT4 0.49, lupus anticoagulant negative, cardiolipin IgM 20.9, beta 2 glycoprotein IgA 23, left knee aspirate showing wbc 2,100 (83% lymphocytes), no crystals culture NGTD, UA negative UPC 0.37   Imaging showing CXR showing b/l pleural effusions and cardiomegaly. CT head with no acute abnormalities. MRI brain normal.       Problem list:  Pericarditis with pericardial effusion s/p pericardial drain placement (drain removed 3/8)  Pleural effusions b/l, left > right  transaminitis   Raynauds   +ELVER 2560 (history of low complements although here normal), +dsDNA, +antihistone Ab  +sm/RNP  Elevated CPK , could be lupus myositis vs overlap syndrome)    Patient meets SLICC criteria for SLE (arthritis, Serositis, +ELVER, +SM/RNP, low complements in past, +dsDNA, +antihistone ab).     Treatments thus far:  Solumedrol 125mg IV 3/4- 3/8  Prednisone 60mg started 3/9 to 3/11  Prednisone 30mg BID 3/12 to present    PLAN:  -upon discharge please change prednisone 30mg BID to prednisone 40mg daily.  -recommend starting plaquenil 200mg BID  -recommend starting PPI for GI ppx while on high dose steroids and please continue on discharge.  -appreciate neurology recs   -continue to trend LFTs, CPK and aldolase daily  -appreciate pulmonary recs on b/l pleural effusions, bedside lung ultrasound showing small pleural effusions, no thoracocentesis recommended at this time.  -follow up myomarker panel, Scl70, RNA polymerase III, quantiferon gold  -patient with positive beta 2  glycoprotein and cardiolipin, will need repeat in 12 weeks   -left knee aspirated 3/8: fluid consistent with inflammatory arthritis; but arthritis survey without erosions.  -pt to follow up with rheumatology closer to home as she can not drive 2/2 epilepsy.  Pt has appt scheduled 3/22 with  Dr Payne in [place].

## 2019-03-12 NOTE — PLAN OF CARE
Problem: Occupational Therapy Goal  Goal: Occupational Therapy Goal  Outcome: Outcome(s) achieved Date Met: 03/12/19  OT evaluation completed and pt d/c'ed 3/12/2019 as pt is at/close to their functional baseline with no further acute OT needs at this time. Please re-consult if there is a change in functional status.

## 2019-03-12 NOTE — SUBJECTIVE & OBJECTIVE
Interval History: no acute overnight events. No chest pain, no sob, pt is walking to bathroom and was sitting in chair earlier today. No new rashes, myalgias, arthralgias, no oral or nasal ulcerations.     Current Facility-Administered Medications   Medication Frequency    acetaminophen tablet 650 mg Q4H PRN    enoxaparin injection 40 mg Daily    eslicarbazepine Tab 1,600 mg QHS    lidocaine (PF) 10 mg/ml (1%) injection 100 mg Once    morphine injection 2 mg On Call Procedure    ondansetron disintegrating tablet 8 mg Q8H PRN    ondansetron injection 4 mg Q8H PRN    predniSONE tablet 30 mg BID    sodium chloride 0.9% flush 3 mL PRN     Objective:     Vital Signs (Most Recent):  Temp: 98.2 °F (36.8 °C) (03/12/19 0818)  Pulse: 97 (03/12/19 1057)  Resp: 18 (03/12/19 0818)  BP: 120/66 (03/12/19 0818)  SpO2: (!) 94 % (03/12/19 0818)  O2 Device (Oxygen Therapy): room air (03/12/19 0438) Vital Signs (24h Range):  Temp:  [97.7 °F (36.5 °C)-98.6 °F (37 °C)] 98.2 °F (36.8 °C)  Pulse:  [] 97  Resp:  [14-21] 18  SpO2:  [94 %-98 %] 94 %  BP: (103-120)/(55-73) 120/66     Weight: 48.1 kg (106 lb 0.7 oz) (03/12/19 0700)  Body mass index is 17.65 kg/m².  Body surface area is 1.49 meters squared.      Intake/Output Summary (Last 24 hours) at 3/12/2019 1214  Last data filed at 3/12/2019 0600  Gross per 24 hour   Intake 1197 ml   Output 1300 ml   Net -103 ml       Physical Exam   Vitals reviewed.  Constitutional: She is oriented to person, place, and time and well-developed, well-nourished, and in no distress. No distress.   HENT:   Head: Normocephalic and atraumatic.   Right Ear: External ear normal.   Left Ear: External ear normal.   Mouth/Throat: Oropharynx is clear and moist. No oropharyngeal exudate.   Eyes: Conjunctivae are normal. Right eye exhibits no discharge. Left eye exhibits no discharge. No scleral icterus.   Neck: Normal range of motion. Neck supple.   Cardiovascular: Normal rate, regular rhythm and normal  heart sounds.    No murmur heard.  Pulmonary/Chest: Effort normal. She has no wheezes.   Decreased breath sounds in bases    Abdominal: Soft. Bowel sounds are normal. She exhibits no distension.   Lymphadenopathy:     She has no cervical adenopathy.   Neurological: She is alert and oriented to person, place, and time.   Skin: Skin is warm and dry. Rash noted. She is not diaphoretic.     Hyperpigmented areas on LE b/l    Psychiatric: Mood and affect normal.   Musculoskeletal: Normal range of motion. She exhibits no edema.   No effusions, enthesitis, dactylitis or synovitis appreciated on exam    5/5 strength upper and lower extremities         Significant Labs:  BMP:   Recent Labs   Lab 03/12/19  1025   GLU 81      K 3.8      CO2 29   BUN 11   CREATININE 0.5   CALCIUM 8.4*     CBC:   No results for input(s): WBC, HGB, HCT, PLT in the last 24 hours.  CMP:   Recent Labs   Lab 03/12/19  1025   GLU 81   CALCIUM 8.4*   ALBUMIN 2.1*   PROT 7.3      K 3.8   CO2 29      BUN 11   CREATININE 0.5   ALKPHOS 91   ALT 55*   *   BILITOT 0.1       Significant Imaging:  Imaging results within the past 24 hours have been reviewed.

## 2019-03-12 NOTE — PLAN OF CARE
Problem: Adult Inpatient Plan of Care  Goal: Patient-Specific Goal (Individualization)  Plan of care discussed with patient. Patient is free of fall/trauma/injury. Denies CP, SOB, or pain/discomfort. All questions addressed. Will continue to monitor

## 2019-03-12 NOTE — SUBJECTIVE & OBJECTIVE
Past Medical History:   Diagnosis Date    Seizures        Past Surgical History:   Procedure Laterality Date    TONSILLECTOMY         Review of patient's allergies indicates:  No Known Allergies    Family History     None        Tobacco Use    Smoking status: Never Smoker   Substance and Sexual Activity    Alcohol use: No     Frequency: Never    Drug use: No    Sexual activity: Yes         Review of Systems   Constitutional: Negative for activity change, chills and fever.   HENT: Negative for rhinorrhea.    Respiratory: Negative for cough and shortness of breath.    Cardiovascular: Negative for chest pain and leg swelling.   Gastrointestinal: Negative for constipation, diarrhea, nausea and vomiting.     Objective:     Vital Signs (Most Recent):  Temp: 98.9 °F (37.2 °C) (03/11/19 1210)  Pulse: (!) 112 (03/11/19 1900)  Resp: (!) 21 (03/11/19 1814)  BP: 117/73 (03/11/19 1704)  SpO2: 98 % (03/11/19 1814) Vital Signs (24h Range):  Temp:  [98 °F (36.7 °C)-98.9 °F (37.2 °C)] 98.9 °F (37.2 °C)  Pulse:  [] 112  Resp:  [18-21] 21  SpO2:  [94 %-99 %] 98 %  BP: ()/(58-73) 117/73     Weight: 55.4 kg (122 lb 2.2 oz)  Body mass index is 20.32 kg/m².      Intake/Output Summary (Last 24 hours) at 3/11/2019 1910  Last data filed at 3/11/2019 1400  Gross per 24 hour   Intake 1197 ml   Output 400 ml   Net 797 ml       Physical Exam   Constitutional: She appears well-developed and well-nourished.   HENT:   Head: Normocephalic and atraumatic.   Eyes: No scleral icterus.   Neck: Neck supple.   Cardiovascular: Normal rate and regular rhythm.   No murmur heard.  Pulmonary/Chest: Effort normal.   Speaking in full sentences, no tachypnea. CTAB   Abdominal: Soft. She exhibits no distension.   Musculoskeletal: She exhibits no edema or deformity.   Neurological: She is alert. Coordination normal.   Normal gait   Skin: Skin is warm and dry. No rash noted.   Vitals reviewed.    Bedside US demonstrates a small right-sided effusion  and minimal left-sided effusion    O2: RA       Lines/Drains/Airways     Peripheral Intravenous Line                 Peripheral IV - Single Lumen 03/07/19 0600 Anterior;Left Hand 4 days                Significant Labs:    CBC/Anemia Profile:  Recent Labs   Lab 03/10/19  0545 03/11/19  0512   WBC 9.14 8.45   HGB 8.4* 8.7*   HCT 26.6* 28.0*    332   MCV 95 98   RDW 14.8* 15.4*        Chemistries:  Recent Labs   Lab 03/10/19  0545 03/11/19  0512    134*   K 4.0 4.0    102   CO2 30* 26   BUN 10 12   CREATININE 0.5 0.5   CALCIUM 8.2* 7.9*   ALBUMIN 1.8* 1.8*   PROT 6.3 6.3   BILITOT 0.1 0.1   ALKPHOS 82 89   ALT 49* 52*   AST 98* 97*   MG 1.2* 1.2*   PHOS 3.0 3.0       Blood Culture: NGTD  Pericardial fluid cultures: NGTD    All pertinent labs within the past 24 hours have been reviewed.      Significant Imaging:   CXR: I have reviewed all pertinent results/findings within the past 24 hours and my personal findings are:  right lateral decubitus film with an air fluid level in the stomach

## 2019-03-12 NOTE — PT/OT/SLP EVAL
"Physical Therapy Evaluation and Discharge    Patient Name:  Pinky Lanier   MRN:  [id number]    Recommendations:     Discharge Recommendations:  (home no needs )   Discharge Equipment Recommendations: none   Barriers to discharge: None    Assessment:     Pinky Lanier is a 29 y.o. female admitted with a medical diagnosis of Pericardial tamponade.  She presents with the following impairments/functional limitations:  (none). Pt is independent with all mobility and ADLs. Pt denies further PT needs at this time. Pt encouraged to remain active during admission, sit up in chair and ambulate multiple times/day after notifying nurse. Pt agreeable. Pt does not require further acute skilled therapy intervention. Discharge from PT services and re-consult if pt experiences a change in status.       Rehab Prognosis: Good;    Recent Surgery: * No surgery found *      Plan:     · Plan of Care: Discharge from acute PT 3/12/2019    Subjective     Chief Complaint: Pt states "it feels good to walk"   Patient/Family Comments/goals: to get better and return home   Pain/Comfort:  · Pain Rating 1: 0/10  · Pain Rating Post-Intervention 1: 0/10    Patients cultural, spiritual, Restorationist conflicts given the current situation:      Living Environment:  Pt lives with boyfriend in a [facility] with threshold [place].   Prior to admission, patients level of function was independent with all mobility and ADLs. Pt working part time cleaning offices.  Equipment used at home: none.  DME owned (not currently used): none.  Upon discharge, patient will have assistance from boyfriend, boyfriends mother.    Objective:     Communicated with RN prior to session.  Patient found HOB elevated, with  telemetry  upon PT entry to room.    General Precautions: Standard, fall   Orthopedic Precautions:N/A   Braces: N/A     Exams:  · Cognitive Exam:  Patient is AAOx4, followed all commands, communicates clearly and fluently  · Gross Motor Coordination:  WFL  · RLE ROM: " WFL  · RLE Strength: WFL  · LLE ROM: WFL  · LLE Strength: WFL    Functional Mobility:  · Bed Mobility:     · Supine to Sit: independence  · Transfers:     · Sit to Stand:  independence with no AD  · Gait: Pt ambulated 350 feet with no AD and independence. Pt with no LOB, no dizziness, no SOB.       Therapeutic Activities and Exercises:   Pt educated on role of PT/POC. Pt verbalized understanding.   Pt encouraged to sit up in chair, ambulate frequently in hallway after notifying nursing. Pt agreeable.     AM-PAC 6 CLICK MOBILITY  Total Score:24     Patient left up in chair with all lines intact, call button in reach and RN  notified.    GOALS:   Multidisciplinary Problems     Physical Therapy Goals     Not on file          Multidisciplinary Problems (Resolved)        Problem: Physical Therapy Goal    Goal Priority Disciplines Outcome Goal Variances Interventions   Physical Therapy Goal   (Resolved)     PT, PT/OT Outcome(s) achieved                     History:     Past Medical History:   Diagnosis Date    Seizures        Past Surgical History:   Procedure Laterality Date    TONSILLECTOMY         Time Tracking:     PT Received On: 03/12/19  PT Start Time: 0849     PT Stop Time: 0901  PT Total Time (min): 12 min     Billable Minutes: Evaluation 12 mins       Freda Doll PT  03/12/2019

## 2019-03-13 VITALS
BODY MASS INDEX: 20.17 KG/M2 | TEMPERATURE: 98 F | WEIGHT: 121.06 LBS | HEART RATE: 110 BPM | SYSTOLIC BLOOD PRESSURE: 124 MMHG | OXYGEN SATURATION: 99 % | HEIGHT: 65 IN | DIASTOLIC BLOOD PRESSURE: 74 MMHG | RESPIRATION RATE: 18 BRPM

## 2019-03-13 LAB
ALBUMIN SERPL BCP-MCNC: 1.9 G/DL
ALP SERPL-CCNC: 81 U/L
ALT SERPL W/O P-5'-P-CCNC: 45 U/L
ANION GAP SERPL CALC-SCNC: 5 MMOL/L
AST SERPL-CCNC: 81 U/L
BACTERIA FLD CULT: NORMAL
BILIRUB SERPL-MCNC: 0.1 MG/DL
BUN SERPL-MCNC: 11 MG/DL
CALCIUM SERPL-MCNC: 8.5 MG/DL
CHLORIDE SERPL-SCNC: 101 MMOL/L
CK SERPL-CCNC: 592 U/L
CO2 SERPL-SCNC: 29 MMOL/L
CREAT SERPL-MCNC: 0.5 MG/DL
EST. GFR  (AFRICAN AMERICAN): >60 ML/MIN/1.73 M^2
EST. GFR  (NON AFRICAN AMERICAN): >60 ML/MIN/1.73 M^2
GLUCOSE SERPL-MCNC: 104 MG/DL
M TB IFN-G CD4+ BCKGRND COR BLD-ACNC: 0 IU/ML
MITOGEN IGNF BCKGRD COR BLD-ACNC: 1.15 IU/ML
MITOGEN IGNF BCKGRD COR BLD-ACNC: NEGATIVE [IU]/ML
NIL: 0.04 IU/ML
POTASSIUM SERPL-SCNC: 4.6 MMOL/L
PROT SERPL-MCNC: 6.7 G/DL
RNA POLYMERASE III ANTIBODIES, IGG, SERUM: 14.1 U
SODIUM SERPL-SCNC: 135 MMOL/L
TB2 - NIL: -0.01 IU/ML

## 2019-03-13 PROCEDURE — 99239 PR HOSPITAL DISCHARGE DAY,>30 MIN: ICD-10-PCS | Mod: ,,, | Performed by: NURSE PRACTITIONER

## 2019-03-13 PROCEDURE — 82085 ASSAY OF ALDOLASE: CPT

## 2019-03-13 PROCEDURE — 63600175 PHARM REV CODE 636 W HCPCS: Performed by: INTERNAL MEDICINE

## 2019-03-13 PROCEDURE — 82550 ASSAY OF CK (CPK): CPT

## 2019-03-13 PROCEDURE — 25000003 PHARM REV CODE 250: Performed by: NURSE PRACTITIONER

## 2019-03-13 PROCEDURE — 36415 COLL VENOUS BLD VENIPUNCTURE: CPT

## 2019-03-13 PROCEDURE — 99239 HOSP IP/OBS DSCHRG MGMT >30: CPT | Mod: ,,, | Performed by: NURSE PRACTITIONER

## 2019-03-13 PROCEDURE — 80053 COMPREHEN METABOLIC PANEL: CPT

## 2019-03-13 RX ORDER — ACETAMINOPHEN 325 MG/1
650 TABLET ORAL EVERY 4 HOURS PRN
Refills: 0 | COMMUNITY
Start: 2019-03-13

## 2019-03-13 RX ORDER — HYDROXYCHLOROQUINE SULFATE 200 MG/1
200 TABLET, FILM COATED ORAL 2 TIMES DAILY
Qty: 60 TABLET | Refills: 1 | Status: SHIPPED | OUTPATIENT
Start: 2019-03-13

## 2019-03-13 RX ORDER — PREDNISONE 20 MG/1
40 TABLET ORAL DAILY
Qty: 60 TABLET | Refills: 1 | Status: SHIPPED | OUTPATIENT
Start: 2019-03-13

## 2019-03-13 RX ORDER — PANTOPRAZOLE SODIUM 40 MG/1
40 TABLET, DELAYED RELEASE ORAL DAILY
Qty: 30 TABLET | Refills: 11 | Status: SHIPPED | OUTPATIENT
Start: 2019-03-14 | End: 2020-03-13

## 2019-03-13 RX ADMIN — PREDNISONE 30 MG: 20 TABLET ORAL at 08:03

## 2019-03-13 RX ADMIN — HYDROXYCHLOROQUINE SULFATE 200 MG: 200 TABLET, FILM COATED ORAL at 08:03

## 2019-03-13 RX ADMIN — PANTOPRAZOLE SODIUM 40 MG: 40 TABLET, DELAYED RELEASE ORAL at 08:03

## 2019-03-13 NOTE — ASSESSMENT & PLAN NOTE
- possible renal involvement given blood/protein on outside UA  - repeat UA- no protein   - obtain 24hr urine protein

## 2019-03-13 NOTE — NURSING
Resume care from previous nurse, pt voice no complaints, lying in bed w/ bed in lowest position and locked. Call bell within reach, introduce myself to pt. Will continue to monitor pt status- FAMILY AT BEDSDIE

## 2019-03-13 NOTE — HPI
Pinky Lanier is a 29 y.o. lady with a previous seizure disorder who presents as a transfer from [facility] in [place] for further evaluation for pericardial effusion.  Per chart review, patient was recently admitted to on 2/25/2019 for acute chest pain.  SHe was diagnosed with acute, likely viral pericarditis and was noted to have moderate pericardial effusion without tamponade physiology.  She was started on colchicine 0.6 mg PO daily and ibuprofen.  She was subsequently discharged on 2/27 in good condition, however she had noticed worsening severe chest pain that was associated with light headedness, dizziness, and dyspnea.  She then presented to [facility] where she was noted to have hypotension with a BP of 90/60.  There, a CT chest revealed a large pericardial effusion as well as bilateral pleural effusions, R > L.  She was started on a levophed gtt for BP management.  Subsequent 2D echo revealed a large effusion with a maximum thickness of 3.2 cm without evidence of collapse of the R ventricle.  She underwent subsequent pericardial drain placement with initial drain output of 500 ccs per shift.  This eventually improved to approximately 50 ccs within the shift prior to transfer.  Fluid analysis noted 13k WBCs, low pH, and glucose in the 60s; gram stain was negative.  Other labs at the time revealed hypoalbuminemia and hypocomplementemia, where there was concern that the underlying effusions were secondary to lupus.  She was started on methylpred 125 mg IV daily.  CBCs at the time mostly revealed an anemia, but otherwise no leukopenia/leukocytosis or thrombocytopenia.  CMP was also unremarkable with normal kidney function.  She was noted there to have an elevated urine protein.  Additionally, neurology was consulted, who performed an EEG without any epileptiform activity noted.  She was started on keppra, continued on eslicarbazepine, and discontinued phenytoin.  ELVER and anti-histone reported  returned with elevated ELVER, but none were reported.      She was transferred to [facility] on 3/7/2019.  She notes that she has never had chest pain like this previously.  Denies any personal history of skin rashes, light sensitivity rashes, malar rashes, oral ulcers, raynaud's phenomenon, or arthritis.  Denies any family history of lupus or any other rheumatologic disease.  Denies any history of miscarriages or DVTs.  Currently living with her boyfriend.

## 2019-03-13 NOTE — NURSING
PT WAS GIVEN A COPY OF HOME CARE DISCHARGE INFORMATION- avs  SIGNED IN Epic.   PT WAS GIVEN A COPY OF HOME CARE MEDICATION SHEET WITH NEXT DOSE WRITTEN ON SHEET. IV AND TELE REMOVED. PT VERBALIZE COMPLETE UNDERSTANDING OF HOME CARE DISCHARGE INSTRUCTIONS AND IMPORTANCE OF FOLLOW UP CARE.  ALSO PT'S MOTHER WAS GIVEN RETURN TO WORK SLIP.  ... [facility] NOTIFIED OF DISCHARGE.

## 2019-03-13 NOTE — PROGRESS NOTES
[facility] Hospital Medicine  Progress Note    Patient Name: Pinky Lanier  MRN: [id number]  Patient Class: IP- Inpatient   Admission Date: 3/7/2019  Length of Stay: 5 days  Attending Physician: PAMELA Thapa MD  Primary Care Provider: [facility] Medicine Team: [facility] MED O Lazara Mcgee NP    Subjective:     Principal Problem:Pericardial tamponade    HPI:  Pinky Lanier is a 29 y.o. lady with a previous seizure disorder who presents as a transfer from [facility] in [place] for further evaluation for pericardial effusion.  Per chart review, patient was recently admitted to on 2/25/2019 for acute chest pain.  SHe was diagnosed with acute, likely viral pericarditis and was noted to have moderate pericardial effusion without tamponade physiology.  She was started on colchicine 0.6 mg PO daily and ibuprofen.  She was subsequently discharged on 2/27 in good condition, however she had noticed worsening severe chest pain that was associated with light headedness, dizziness, and dyspnea.  She then presented to [facility] where she was noted to have hypotension with a BP of 90/60.  There, a CT chest revealed a large pericardial effusion as well as bilateral pleural effusions, R > L.  She was started on a levophed gtt for BP management.  Subsequent 2D echo revealed a large effusion with a maximum thickness of 3.2 cm without evidence of collapse of the R ventricle.  She underwent subsequent pericardial drain placement with initial drain output of 500 ccs per shift.  This eventually improved to approximately 50 ccs within the shift prior to transfer.  Fluid analysis noted 13k WBCs, low pH, and glucose in the 60s; gram stain was negative.  Other labs at the time revealed hypoalbuminemia and hypocomplementemia, where there was concern that the underlying effusions were secondary to lupus.  She was started on methylpred 125 mg IV daily.  CBCs at the time mostly revealed  an anemia, but otherwise no leukopenia/leukocytosis or thrombocytopenia.  CMP was also unremarkable with normal kidney function.  She was noted there to have an elevated urine protein.  Additionally, neurology was consulted, who performed an EEG without any epileptiform activity noted.  She was started on keppra, continued on eslicarbazepine, and discontinued phenytoin.  ELVER and anti-histone reported returned with elevated ELVER, but none were reported.      She was transferred to [facility] on 3/7/2019.  She notes that she has never had chest pain like this previously.  Denies any personal history of skin rashes, light sensitivity rashes, malar rashes, oral ulcers, raynaud's phenomenon, or arthritis.  Denies any family history of lupus or any other rheumatologic disease.  Denies any history of miscarriages or DVTs.  Currently living with her boyfriend.    Hospital Course:  She was admitted to MICU on 3/7. Pericardial drain pulled today 3/8. Did have some encephalopathy yesterday which has improved today. Unclear if this was 2/2 addition of keppra at OSH vs neuropsych SLE. Neurology consulted and recommended discontinuing Keppra, increasing aptiom to 1600mg/d and MRI. Cardiology following and recommend repeat TTE in 2-3 days. Rheumatology also on board.Patient transferred out of ICU 3/8/19.  Repeat STEFFI only showed a trivial pericardial effusion. Pulmonary was consulted and deemed a thoracentesis risk outweighs benefits.  Rheumatology following of SLE and recommendations for steroids plaquenil.     Interval History: Patient seen at bedside with mother doing well. Denies pain. Has be afebrile.  Discussed plan on upcoming discharge and follow-up with [facility] closer to home.    Review of Systems   Constitutional: Positive for fatigue. Negative for appetite change, chills and fever.   HENT: Negative for trouble swallowing.    Respiratory: Negative for cough, chest tightness, shortness of breath and wheezing.    Cardiovascular:  Negative for chest pain, palpitations and leg swelling.   Gastrointestinal: Negative for abdominal pain, constipation, diarrhea and nausea.   Genitourinary: Negative for difficulty urinating, frequency and urgency.   Musculoskeletal: Positive for arthralgias. Negative for myalgias.   Skin: Negative for rash.   Neurological: Negative for dizziness, weakness, light-headedness and headaches.   Psychiatric/Behavioral: Negative for sleep disturbance.     Scheduled Meds:   enoxaparin  40 mg Subcutaneous Daily    eslicarbazepine  1,600 mg Oral QHS    hydroxychloroquine  200 mg Oral BID    lidocaine (PF) 10 mg/ml (1%)  10 mL Intradermal Once    [START ON 3/13/2019] pantoprazole  40 mg Oral Daily    predniSONE  30 mg Oral BID     Continuous Infusions:  PRN Meds:.acetaminophen, morphine, ondansetron, ondansetron, sodium chloride 0.9%    Objective:     Vital Signs (Most Recent):  Temp: 98.5 °F (36.9 °C) (03/12/19 1955)  Pulse: (!) 112 (03/12/19 1955)  Resp: 16 (03/12/19 1955)  BP: 116/61 (03/12/19 1955)  SpO2: 97 % (03/12/19 1955) Vital Signs (24h Range):  Temp:  [97.7 °F (36.5 °C)-98.5 °F (36.9 °C)] 98.5 °F (36.9 °C)  Pulse:  [] 112  Resp:  [14-18] 16  SpO2:  [94 %-100 %] 97 %  BP: (103-120)/(55-66) 116/61     Weight: 48.1 kg (106 lb 0.7 oz)  Body mass index is 17.65 kg/m².    Intake/Output Summary (Last 24 hours) at 3/12/2019 2059  Last data filed at 3/12/2019 1400  Gross per 24 hour   Intake 1080 ml   Output 1300 ml   Net -220 ml      Physical Exam   Constitutional: She is oriented to person, place, and time. She appears well-developed and well-nourished. No distress.   Cardiovascular: Normal rate, regular rhythm and normal heart sounds. Exam reveals no gallop and no friction rub.   No murmur heard.  Pulmonary/Chest: Effort normal. No respiratory distress. She has no wheezes. She has rales.   Abdominal: Soft. Bowel sounds are normal. She exhibits no distension. There is no tenderness.   Musculoskeletal: Normal  range of motion. She exhibits edema. She exhibits no tenderness.   Edema to bilateral knees   Neurological: She is alert and oriented to person, place, and time.   Skin: Skin is warm and dry. No rash noted. She is not diaphoretic. No cyanosis. Nails show no clubbing.   Psychiatric: She has a normal mood and affect. Her behavior is normal.       Significant Labs:   BMP:   Recent Labs   Lab 03/11/19 0512 03/12/19  1025   GLU 91 81   * 137   K 4.0 3.8    100   CO2 26 29   BUN 12 11   CREATININE 0.5 0.5   CALCIUM 7.9* 8.4*   MG 1.2*  --      CBC:   Recent Labs   Lab 03/11/19 0512   WBC 8.45   HGB 8.7*   HCT 28.0*        Magnesium:   Recent Labs   Lab 03/11/19 0512   MG 1.2*       Significant Imaging: I have reviewed all pertinent imaging results/findings within the past 24 hours.    Assessment/Plan:      * Pericardial tamponade    - noted tamponade s/p pericardial drain  - high consideration for connective tissue disease/autoimmune disease as cause   - overall, labs suspicious for SLE  - hold off antibiotics given no growth per OSH  - rheumatology consulted, appreciate recommendations and assistance. On IV steroids per their recommendation, transitioned to PO prednisone 60mg  - cards following  - pericardial drain removed 3/8/19  - cards recommending limited STEFFI in 2-3 days, repeat showed trivial pericardial effusion but does note large pleural effusion on the left   - will d/w pulm consults--risks out weighs benefits and thoracentesis not luis f     Proteinuria    - possible renal involvement given blood/protein on outside UA  - repeat UA- no protein   - obtain 24hr urine protein   - may end up needing renal biopsy      ELVER positive    - + ELVER in setting of pericarditis and pleural effusion  - Highly suspicious for SLE  - Appreciate rheumatology recs  - left knee aspirated per rheum, c/w inflammatory arthritis  - CPK elevated  - checking arthritis Xrays     Anemia    - noted anemia with MCV of  97  - hemodynamically stable  - no evidence of acute blood loss  - transfuse < 7 as needed   - daily CBCs     Seizure disorder    - known previous history, states taking medication since her childhood  - previously on aptiom and dilantin  - dilantin discontinued given risk for SLE, started on keppra but discontinued  - currently on home aptiom per neuro recs  - some confusion noted in ICU, MRI brain performed  - Neurology consulted, appreciate recs  - MRI brain nonacute       VTE Risk Mitigation (From admission, onward)        Ordered     enoxaparin injection 40 mg  Daily      03/07/19 1040     IP VTE HIGH RISK PATIENT  Once      03/07/19 0618     Place sequential compression device  Until discontinued      03/07/19 0618          Lazara Mcgee NP  Department of Hospital Medicine   [facility]

## 2019-03-13 NOTE — ASSESSMENT & PLAN NOTE
- known previous history, states taking medication since her childhood  - previously on aptiom and dilantin  - dilantin discontinued given risk for SLE, started on keppra but discontinued  - currently on home aptiom per neuro recs  - some confusion noted in ICU, MRI brain performed  - Neurology consulted, appreciate recs  - MRI brain nonacute

## 2019-03-13 NOTE — ASSESSMENT & PLAN NOTE
- + LEVER in setting of pericarditis and pleural effusion  - Highly suspicious for SLE  - Appreciate rheumatology recs  - left knee aspirated per rheum, c/w inflammatory arthritis  - CPK elevated  - checking arthritis Xrays

## 2019-03-13 NOTE — SUBJECTIVE & OBJECTIVE
Interval History: Patient seen at bedside with mother doing well. Denies pain. Has be afebrile.  Discussed plan on upcoming discharge and follow-up with [facility] closer to home.    Review of Systems   Constitutional: Positive for fatigue. Negative for appetite change, chills and fever.   HENT: Negative for trouble swallowing.    Respiratory: Negative for cough, chest tightness, shortness of breath and wheezing.    Cardiovascular: Negative for chest pain, palpitations and leg swelling.   Gastrointestinal: Negative for abdominal pain, constipation, diarrhea and nausea.   Genitourinary: Negative for difficulty urinating, frequency and urgency.   Musculoskeletal: Positive for arthralgias. Negative for myalgias.   Skin: Negative for rash.   Neurological: Negative for dizziness, weakness, light-headedness and headaches.   Psychiatric/Behavioral: Negative for sleep disturbance.     Scheduled Meds:   enoxaparin  40 mg Subcutaneous Daily    eslicarbazepine  1,600 mg Oral QHS    hydroxychloroquine  200 mg Oral BID    lidocaine (PF) 10 mg/ml (1%)  10 mL Intradermal Once    [START ON 3/13/2019] pantoprazole  40 mg Oral Daily    predniSONE  30 mg Oral BID     Continuous Infusions:  PRN Meds:.acetaminophen, morphine, ondansetron, ondansetron, sodium chloride 0.9%    Objective:     Vital Signs (Most Recent):  Temp: 98.5 °F (36.9 °C) (03/12/19 1955)  Pulse: (!) 112 (03/12/19 1955)  Resp: 16 (03/12/19 1955)  BP: 116/61 (03/12/19 1955)  SpO2: 97 % (03/12/19 1955) Vital Signs (24h Range):  Temp:  [97.7 °F (36.5 °C)-98.5 °F (36.9 °C)] 98.5 °F (36.9 °C)  Pulse:  [] 112  Resp:  [14-18] 16  SpO2:  [94 %-100 %] 97 %  BP: (103-120)/(55-66) 116/61     Weight: 48.1 kg (106 lb 0.7 oz)  Body mass index is 17.65 kg/m².    Intake/Output Summary (Last 24 hours) at 3/12/2019 2059  Last data filed at 3/12/2019 1400  Gross per 24 hour   Intake 1080 ml   Output 1300 ml   Net -220 ml      Physical Exam   Constitutional: She is oriented to  person, place, and time. She appears well-developed and well-nourished. No distress.   Cardiovascular: Normal rate, regular rhythm and normal heart sounds. Exam reveals no gallop and no friction rub.   No murmur heard.  Pulmonary/Chest: Effort normal. No respiratory distress. She has no wheezes. She has rales.   Abdominal: Soft. Bowel sounds are normal. She exhibits no distension. There is no tenderness.   Musculoskeletal: Normal range of motion. She exhibits edema. She exhibits no tenderness.   Edema to bilateral knees   Neurological: She is alert and oriented to person, place, and time.   Skin: Skin is warm and dry. No rash noted. She is not diaphoretic. No cyanosis. Nails show no clubbing.   Psychiatric: She has a normal mood and affect. Her behavior is normal.       Significant Labs:   BMP:   Recent Labs   Lab 03/11/19 0512 03/12/19  1025   GLU 91 81   * 137   K 4.0 3.8    100   CO2 26 29   BUN 12 11   CREATININE 0.5 0.5   CALCIUM 7.9* 8.4*   MG 1.2*  --      CBC:   Recent Labs   Lab 03/11/19 0512   WBC 8.45   HGB 8.7*   HCT 28.0*        Magnesium:   Recent Labs   Lab 03/11/19 0512   MG 1.2*       Significant Imaging: I have reviewed all pertinent imaging results/findings within the past 24 hours.

## 2019-03-13 NOTE — HOSPITAL COURSE
She was admitted to MICU on 3/7. Pericardial drain pulled today 3/8. Did have some encephalopathy yesterday which has improved today. Unclear if this was 2/2 addition of keppra at OSH vs neuropsych SLE. Neurology consulted and recommended discontinuing Keppra, increasing aptiom to 1600mg/d and MRI. Cardiology following and recommend repeat TTE in 2-3 days. Rheumatology also on board.Patient transferred out of ICU 3/8/19.  Repeat STEFFI only showed a trivial pericardial effusion. Pulmonary was consulted and deemed a thoracentesis risk outweighs benefits.  Rheumatology following of SLE and recommendations for steroids plaquenil and PPI. Discharge home with current Rheumatology recommendations.  Patient has scheduled appointment with Dr. Payne in [place] on 3/22/19.

## 2019-03-13 NOTE — ASSESSMENT & PLAN NOTE
- noted tamponade s/p pericardial drain  - high consideration for connective tissue disease/autoimmune disease as cause   - overall, labs suspicious for SLE  - hold off antibiotics given no growth per OSH  - rheumatology consulted, appreciate recommendations and assistance. On IV steroids per their recommendation, transitioned to PO prednisone 60mg  - cards following  - pericardial drain removed 3/8/19  - cards recommending limited STEFFI in 2-3 days, repeat showed trivial pericardial effusion but does note large pleural effusion on the left   - will d/w pulm consults--risks out weighs benefits and thoracentesis not luis f

## 2019-03-13 NOTE — NURSING
Gave pt's mother  The updated return work slip. Escort notified of d/c     Did not wait for escort, left w/ all of their belongings

## 2019-03-13 NOTE — PLAN OF CARE
Problem: Adult Inpatient Plan of Care  Goal: Plan of Care Review  Outcome: Ongoing (interventions implemented as appropriate)  UPDATED CARE PLAN W/ PT . PT IS PENDING D/C TODAY. NO FALLS DURING SHIFT. NO FALLS DURING SHIFT. ADDRESS ALL NEEDS THROUGHOUT SHIFT.

## 2019-03-13 NOTE — ASSESSMENT & PLAN NOTE
- + ELVER in setting of pericarditis and pleural effusion  - Highly suspicious for SLE  - Appreciate rheumatology recs  - left knee aspirated per rheum, c/w inflammatory arthritis  - CPK elevated--decreasing upon discharge  - checking arthritis Xrays

## 2019-03-13 NOTE — TREATMENT PLAN
03/13/2019      Pinky Lanier  [facility] [address] [place] [address]          Hospital Medicine Dept.  [facility] [address] [place] [address]  [phone] after hours  [phone] fax Pinky Lanier has been hospitalized at the [facility] since 3/7/2019 to 3/13/19 Please excuse the patient and her Dev Lanier and Jayden Chaparro from duties.  Patient may return on 3/14/19.  No restrictions.     Please contact me if you have any questions.                  __________________________  W Mati Thapa MD  03/13/2019

## 2019-03-13 NOTE — PLAN OF CARE
Problem: Adult Inpatient Plan of Care  Goal: Plan of Care Review  Outcome: Ongoing (interventions implemented as appropriate)  Patient remained free of falls/injury/trauma throughout shift. Patient AAOx4. Neuro status unchanged. VSS. No complaints of chest pain or SOB.Patient continues to void per the bedside commode. Fall risk precautions reviewed and maintained with patient. POC reviewed with patient. Patient verbalized understanding. Will continue to monitor.

## 2019-03-13 NOTE — DISCHARGE SUMMARY
[facility] Hospital Medicine  Discharge Summary      Patient Name: Pinky Lanier  MRN: [id number]  Admission Date: 3/7/2019  Hospital Length of Stay: 6 days  Discharge Date and Time:  03/13/2019 11:08 AM  Attending Physician: PAMELA Thapa MD   Discharging Provider: Lazara Mcgee NP  Primary Care Provider: [facility] Medicine Team: [facility] Lazara Mcgee NP    HPI:   Pinky Lanier is a 29 y.o. lady with a previous seizure disorder who presents as a transfer from [facility] in [place] for further evaluation for pericardial effusion.  Per chart review, patient was recently admitted to on 2/25/2019 for acute chest pain.  SHe was diagnosed with acute, likely viral pericarditis and was noted to have moderate pericardial effusion without tamponade physiology.  She was started on colchicine 0.6 mg PO daily and ibuprofen.  She was subsequently discharged on 2/27 in good condition, however she had noticed worsening severe chest pain that was associated with light headedness, dizziness, and dyspnea.  She then presented to [facility] where she was noted to have hypotension with a BP of 90/60.  There, a CT chest revealed a large pericardial effusion as well as bilateral pleural effusions, R > L.  She was started on a levophed gtt for BP management.  Subsequent 2D echo revealed a large effusion with a maximum thickness of 3.2 cm without evidence of collapse of the R ventricle.  She underwent subsequent pericardial drain placement with initial drain output of 500 ccs per shift.  This eventually improved to approximately 50 ccs within the shift prior to transfer.  Fluid analysis noted 13k WBCs, low pH, and glucose in the 60s; gram stain was negative.  Other labs at the time revealed hypoalbuminemia and hypocomplementemia, where there was concern that the underlying effusions were secondary to lupus.  She was started on methylpred 125 mg IV daily.  CBCs at the time  mostly revealed an anemia, but otherwise no leukopenia/leukocytosis or thrombocytopenia.  CMP was also unremarkable with normal kidney function.  She was noted there to have an elevated urine protein.  Additionally, neurology was consulted, who performed an EEG without any epileptiform activity noted.  She was started on keppra, continued on eslicarbazepine, and discontinued phenytoin.  ELVER and anti-histone reported returned with elevated ELVER, but none were reported.      She was transferred to [facility] on 3/7/2019.  She notes that she has never had chest pain like this previously.  Denies any personal history of skin rashes, light sensitivity rashes, malar rashes, oral ulcers, raynaud's phenomenon, or arthritis.  Denies any family history of lupus or any other rheumatologic disease.  Denies any history of miscarriages or DVTs.  Currently living with her boyfriend.    Hospital Course:   She was admitted to MICU on 3/7. Pericardial drain pulled today 3/8. Did have some encephalopathy yesterday which has improved today. Unclear if this was 2/2 addition of keppra at OSH vs neuropsych SLE. Neurology consulted and recommended discontinuing Keppra, increasing aptiom to 1600mg/d and MRI. Cardiology following and recommend repeat TTE in 2-3 days. Rheumatology also on board.Patient transferred out of ICU 3/8/19.  Repeat STEFFI only showed a trivial pericardial effusion. Pulmonary was consulted and deemed a thoracentesis risk outweighs benefits.  Rheumatology following of SLE and recommendations for steroids plaquenil and PPI. Discharge home with current Rheumatology recommendations.  Patient has scheduled appointment with Dr. Payne in [place] on 3/22/19.     Consults:   Consults (From admission, onward)        Status Ordering Provider     Inpatient consult to Interventional Cardiology  Once     Provider:  (Not yet assigned)    LILLIAM Bailey     Inpatient consult to Neurology  Once     Provider:  (Not yet  assigned)    Completed LILLIAM WALLS     Inpatient consult to Pulmonology  Once     Provider:  (Not yet assigned)    Completed REX SANCHEZ     Inpatient consult to Registered Dietitian/Nutritionist  Once     Provider:  (Not yet assigned)    Completed REX SANCHEZ     Inpatient consult to Rheumatology  Once     Provider:  (Not yet assigned)    Completed SALEEM NORRIS          * Pericardial tamponade    - noted tamponade s/p pericardial drain  - high consideration for connective tissue disease/autoimmune disease as cause   - overall, labs suspicious for SLE  - hold off antibiotics given no growth per OSH  - rheumatology consulted, appreciate recommendations and assistance. On IV steroids per their recommendation, transitioned to PO prednisone 60mg  - cards following  - pericardial drain removed 3/8/19  - cards recommending limited STEFFI in 2-3 days, repeat showed trivial pericardial effusion but does note large pleural effusion on the left   - will d/w pulm consults--risks out weighs benefits and thoracentesis not luis f     Proteinuria    - possible renal involvement given blood/protein on outside UA  - repeat UA- no protein   - obtain 24hr urine protein      ELVER positive    - + ELVER in setting of pericarditis and pleural effusion  - Highly suspicious for SLE  - Appreciate rheumatology recs  - left knee aspirated per rheum, c/w inflammatory arthritis  - CPK elevated--decreasing upon discharge  - checking arthritis Xrays     Anemia    - noted anemia with MCV of 97  - hemodynamically stable  - no evidence of acute blood loss  - transfuse < 7 as needed   - daily CBCs     Seizure disorder    - known previous history, states taking medication since her childhood  - previously on aptiom and dilantin  - dilantin discontinued given risk for SLE, started on keppra but discontinued  - currently on home aptiom per neuro recs  - some confusion noted in ICU, MRI brain performed  - Neurology consulted, appreciate  recs  - MRI brain nonacute       Final Active Diagnoses:    Diagnosis Date Noted POA    PRINCIPAL PROBLEM:  Pericardial tamponade [I31.4] 03/07/2019 Yes    Proteinuria [R80.9] 03/12/2019 Unknown    Seizure disorder [G40.909] 03/07/2019 Unknown    Anemia [D64.9] 03/07/2019 Unknown    Pericardial effusion [I31.3] 03/07/2019 Yes    ELVER positive [R76.8] 03/07/2019 No      Problems Resolved During this Admission:       Discharged Condition: stable    Disposition: Home or Self Care    Follow Up:  Follow-up Information     Primary Doctor No In 2 weeks.               Patient Instructions:      Diet Adult Regular     No driving until:     Notify your health care provider if you experience any of the following:  temperature >100.4     Notify your health care provider if you experience any of the following:  persistent nausea and vomiting or diarrhea     Notify your health care provider if you experience any of the following:  severe uncontrolled pain     Notify your health care provider if you experience any of the following:  difficulty breathing or increased cough     Notify your health care provider if you experience any of the following:  increased confusion or weakness     Notify your health care provider if you experience any of the following:  persistent dizziness, light-headedness, or visual disturbances     Notify your health care provider if you experience any of the following:  severe persistent headache     Activity as tolerated       Significant Diagnostic Studies: Labs:   BMP:   Recent Labs   Lab 03/12/19  1025 03/13/19  0633   GLU 81 104    135*   K 3.8 4.6    101   CO2 29 29   BUN 11 11   CREATININE 0.5 0.5   CALCIUM 8.4* 8.5*   , CBC No results for input(s): WBC, HGB, HCT, PLT in the last 48 hours. and All labs within the past 24 hours have been reviewed    Pending Diagnostic Studies:     Procedure Component Value Units Date/Time    Aldolase [[id number]] Collected:  03/13/19 0633    Order  Status:  Sent Lab Status:  In process Updated:  03/13/19 0801    Specimen:  Blood     Aldolase [[id number]] Collected:  03/12/19 1025    Order Status:  Sent Lab Status:  In process Updated:  03/12/19 1110    Specimen:  Blood     Narrative:       Collection has been rescheduled by CBE at 03/12/2019 08:58 Reason:   patient working with PT will try back later  Collection has been rescheduled by [facility]3 at 03/12/2019 09:51 Reason:   Unable to collect    Aldolase [[id number]] Collected:  03/11/19 1113    Order Status:  Sent Lab Status:  In process Updated:  03/11/19 1127    Specimen:  Blood     MyoMarker Panel 3 [[id number]] Collected:  03/11/19 1113    Order Status:  Sent Lab Status:  In process Updated:  03/11/19 1127    Specimen:  Blood     Quantiferon Gold TB [[id number]] Collected:  03/11/19 1113    Order Status:  Sent Lab Status:  In process Updated:  03/11/19 1127    Specimen:  Blood     RNA polymerase III Ab, IgG [[id number]] Collected:  03/11/19 1113    Order Status:  Sent Lab Status:  In process Updated:  03/11/19 1127    Specimen:  Blood          Medications:  Reconciled Home Medications:      Medication List      START taking these medications    acetaminophen 325 MG tablet  Commonly known as:  TYLENOL  Take 2 tablets (650 mg total) by mouth every 4 (four) hours as needed.     eslicarbazepine 400 mg Tab  Commonly known as:  APTIOM  Take 4 tablets (1,600 mg total) by mouth every evening.     hydroxychloroquine 200 mg tablet  Commonly known as:  PLAQUENIL  Take 1 tablet (200 mg total) by mouth 2 (two) times daily.     pantoprazole 40 MG tablet  Commonly known as:  PROTONIX  Take 1 tablet (40 mg total) by mouth once daily.  Start taking on:  3/14/2019     predniSONE 20 MG tablet  Commonly known as:  DELTASONE  Take 2 tablets (40 mg total) by mouth once daily.            Indwelling Lines/Drains at time of discharge:   Lines/Drains/Airways          None          Time spent on the discharge of patient: 40 minutes  Patient  was seen and examined on the date of discharge and determined to be suitable for discharge.         Lazara Mcgee NP  Department of Hospital Medicine  [facility]

## 2019-03-13 NOTE — ASSESSMENT & PLAN NOTE
- possible renal involvement given blood/protein on outside UA  - repeat UA- no protein   - obtain 24hr urine protein   - may end up needing renal biopsy

## 2019-03-14 NOTE — PLAN OF CARE
03/14/19 0927   Final Note   Assessment Type Final Discharge Note   Anticipated Discharge Disposition Home

## 2019-03-15 LAB
ALDOLASE SERPL-CCNC: 14.3 U/L
ALDOLASE SERPL-CCNC: 17.8 U/L
ALDOLASE SERPL-CCNC: 19.3 U/L

## 2019-03-18 LAB — PATH INTERP FLD-IMP: NORMAL

## 2019-04-01 LAB
ANTI-PM/SCL AB: <20 UNITS
ANTI-SS-A 52 KD AB, IGG: <20 UNITS
EJ AB SER QL: NEGATIVE
ENA JO1 AB SER IA-ACNC: <20 UNITS
ENA SM+RNP AB SER IA-ACNC: 132 UNITS
FIBRILLARIN (U3 RNP): NEGATIVE
KU AB SER QL: NEGATIVE
MDA-5 (P140): <20 UNITS
MI2 AB SER QL: NEGATIVE
NXP-2 (P140): <20 UNITS
OJ AB SER QL: NEGATIVE
PL12 AB SER QL: NEGATIVE
PL7 AB SER QL: NEGATIVE
SRP AB SERPL QL: NEGATIVE
TIF1 GAMMA (P155/140): <20 UNITS
U2 SNRNP: NEGATIVE

## 2019-05-09 RX ORDER — HYDROXYCHLOROQUINE SULFATE 200 MG/1
TABLET, FILM COATED ORAL
Qty: 60 TABLET | Refills: 0 | OUTPATIENT
Start: 2019-05-09

## 2019-05-10 NOTE — PROCEDURES
DATE OF SERVICE:  03/07/2019    EEG NUMBER:  [id number].    REQUESTED BY:  Dr. Henson.    LOCATION OF SERVICE:  6089.    ELECTROENCEPHALOGRAM REPORT    METHODOLOGY:  Electroencephalographic (EEG) recording is recorded with   electrodes placed according to the International 10-20 placement system.  Thirty   two (32) channels of digital signal (sampling rate of 512/sec), including T1   and T2, were simultaneously recorded from the scalp and may include EKG, EMG,   and/or eye monitors.  Recording band pass was 0.1 to 512 Hz.  Digital video   recording of the patient is simultaneously recorded with the EEG.  The patient   is instructed to report clinical symptoms which may occur during the recording   session.  EEG and video recording are stored and archived in digital format.    Activation procedures, which include photic stimulation, hyperventilation and   instructing patients to perform simple tasks, are done in selected patients.    The EEG is displayed on a monitor screen and can be reviewed using different   montages.  Computer assisted-analysis is employed to detect spike and   electrographic seizure activity.  The entire record is submitted for computer   analysis.  The entire recording is visually reviewed, and the times identified   by computer analysis as being spikes or seizures are reviewed again.    Compressed spectral analysis (CSA) is also performed on the activity recorded   from each individual channel.  This is displayed as a power display of   frequencies from 0 to 30 Hz over time.  The CSA is reviewed looking for   asymmetries in power between homologous areas of the scalp, then compared with   the original EEG recording.    Energy Focus software was also utilized in the review of this study.  This software   suite analyzes the EEG recording in multiple domains.  Coherence and rhythmicity   are computed to identify EEG sections which may contain organized seizures.    Each channel undergoes analysis to  detect the presence of spike and sharp waves   which have special and morphological characteristics of epileptic activity.  The   routine EEG recording is converted from special into frequency domain.  This is   then displayed comparing homologous areas to identify areas of significant   asymmetry.  Algorithm to identify non-cortically generated artifact is used to   separate artifact from the EEG.    EEG FINDINGS:  Recording was obtained at the patient's bedside in her hospital   room.  The patient was sitting in bed and interacting with individuals in the   room.  Background was somewhat irregular, predominantly 7 Hz frequency seen in   the occipital, parietal, and posterior temporal regions bilaterally.  In the mid   and frontal regions, there is an upper range beta spindles noted, but it is low   amplitude.  Intermittently, 2 to 3 seconds of midrange theta would be noted   bilaterally.  Light sleep was recorded with posterior dominant rhythm being   replaced by generalized irregular 5 to 6 Hz theta.  Other sleep elements   included V waves and occasional spindle.  No lateralized or focal findings were   noted and no spike or sharp wave activity was seen.  Activation procedures were   not carried out.  The patient was asked to wiggle her feet and squeeze the   examiner's hand, which she did correctly.  Response asked where she was located   at the diopter.  She correctly identified the hand that the technologist was   holding up.    IMPRESSION:  Abnormal EEG with mild-to-moderate slowing of the intrinsic rhythms   indicative of a nonfocal and nonspecific encephalopathy without focal changes   and no evidence for an epileptic process.    CLINICAL CORRELATION:  The patient is a 29-year-old female who reported to have   seizures, which were not described clinically.  This tracing shows an   encephalopathic pattern without focality and without evidence for an irritative   process.      RR/IN  dd: 05/10/2019 14:34:22  (CDT)  td: 05/10/2019 14:52:39 (COLETTE)  Doc ID   #[id number]  Job ID #[id number]    CC:

## 2019-07-26 RX ORDER — ESLICARBAZEPINE ACETATE 800 MG/1
TABLET ORAL
Qty: 60 TABLET | Refills: 0 | OUTPATIENT
Start: 2019-07-26

## 2020-05-12 RX ORDER — HYDROXYCHLOROQUINE SULFATE 200 MG/1
TABLET, FILM COATED ORAL
Qty: 180 TABLET | Refills: 1 | OUTPATIENT
Start: 2020-05-12

## 2021-07-21 NOTE — PROGRESS NOTES
Brief Interventional Cardiology Note:    Consult received and chart reviewed. Full note to follow.    -Will monitor drain output  -Anticipate bedside removal likely tomorrow AM if drain output remains <50 cc over 24 hrs    Cesar Billy MD  Cardiology Fellow PGY-5  Pager: [phone]     Simran Flores is a 74 

## 2021-09-29 NOTE — CONSULTS
Consult received, full note to follow      Terbinafine Pregnancy And Lactation Text: This medication is Pregnancy Category B and is considered safe during pregnancy. It is also excreted in breast milk and breast feeding isn't recommended.

## 2025-02-01 NOTE — NURSING
CM consulted for Medication cost assistance ([employer]); patient has Medicare as primary insurance plan and does not qualify for  coupon card via website.  CM provided RN with drug  Patient Assistance Program information for patient reference.     Hali Bautista RN Case Manager [phone]    Pt transferred to room 302 via wheel chair on tele monitor and on room air, no signs of distress noted.  Patient belongings returned to patient, paper chart given to nurse Davis at bedside.